# Patient Record
Sex: FEMALE | Race: BLACK OR AFRICAN AMERICAN | NOT HISPANIC OR LATINO | Employment: UNEMPLOYED | URBAN - METROPOLITAN AREA
[De-identification: names, ages, dates, MRNs, and addresses within clinical notes are randomized per-mention and may not be internally consistent; named-entity substitution may affect disease eponyms.]

---

## 2018-04-12 ENCOUNTER — HOSPITAL ENCOUNTER (EMERGENCY)
Facility: OTHER | Age: 63
Discharge: HOME | End: 2018-04-12
Attending: EMERGENCY MEDICINE
Payer: MEDICAID

## 2018-04-12 VITALS
TEMPERATURE: 98 F | HEART RATE: 84 BPM | BODY MASS INDEX: 27.31 KG/M2 | WEIGHT: 160 LBS | DIASTOLIC BLOOD PRESSURE: 87 MMHG | OXYGEN SATURATION: 100 % | SYSTOLIC BLOOD PRESSURE: 121 MMHG | RESPIRATION RATE: 17 BRPM | HEIGHT: 64 IN

## 2018-04-12 DIAGNOSIS — R10.9 ABDOMINAL PAIN: Primary | ICD-10-CM

## 2018-04-12 DIAGNOSIS — K86.1 CHRONIC PANCREATITIS, UNSPECIFIED PANCREATITIS TYPE: ICD-10-CM

## 2018-04-12 LAB
ALBUMIN SERPL BCP-MCNC: 3.3 G/DL
ALP SERPL-CCNC: 129 U/L
ALT SERPL W/O P-5'-P-CCNC: 44 U/L
ANION GAP SERPL CALC-SCNC: 12 MMOL/L
AST SERPL-CCNC: 93 U/L
BACTERIA #/AREA URNS HPF: ABNORMAL /HPF
BASOPHILS # BLD AUTO: 0.01 K/UL
BASOPHILS NFR BLD: 0.1 %
BILIRUB SERPL-MCNC: 1.3 MG/DL
BILIRUB UR QL STRIP: NEGATIVE
BUN SERPL-MCNC: 9 MG/DL
CALCIUM SERPL-MCNC: 9.7 MG/DL
CHLORIDE SERPL-SCNC: 97 MMOL/L
CLARITY UR: CLEAR
CO2 SERPL-SCNC: 23 MMOL/L
COLOR UR: YELLOW
CREAT SERPL-MCNC: 0.8 MG/DL
DIFFERENTIAL METHOD: ABNORMAL
EOSINOPHIL # BLD AUTO: 0.2 K/UL
EOSINOPHIL NFR BLD: 2 %
ERYTHROCYTE [DISTWIDTH] IN BLOOD BY AUTOMATED COUNT: 13.5 %
EST. GFR  (AFRICAN AMERICAN): >60 ML/MIN/1.73 M^2
EST. GFR  (NON AFRICAN AMERICAN): >60 ML/MIN/1.73 M^2
GLUCOSE SERPL-MCNC: 112 MG/DL
GLUCOSE UR QL STRIP: NEGATIVE
HCT VFR BLD AUTO: 40 %
HGB BLD-MCNC: 13.8 G/DL
HGB UR QL STRIP: NEGATIVE
KETONES UR QL STRIP: NEGATIVE
LEUKOCYTE ESTERASE UR QL STRIP: ABNORMAL
LIPASE SERPL-CCNC: 173 U/L
LYMPHOCYTES # BLD AUTO: 3.2 K/UL
LYMPHOCYTES NFR BLD: 38.9 %
MCH RBC QN AUTO: 31.9 PG
MCHC RBC AUTO-ENTMCNC: 34.5 G/DL
MCV RBC AUTO: 92 FL
MICROSCOPIC COMMENT: ABNORMAL
MONOCYTES # BLD AUTO: 0.8 K/UL
MONOCYTES NFR BLD: 9.5 %
NEUTROPHILS # BLD AUTO: 4 K/UL
NEUTROPHILS NFR BLD: 49.3 %
NITRITE UR QL STRIP: NEGATIVE
PH UR STRIP: 7 [PH] (ref 5–8)
PLATELET # BLD AUTO: 197 K/UL
PMV BLD AUTO: 11.9 FL
POTASSIUM SERPL-SCNC: 4.9 MMOL/L
PROT SERPL-MCNC: 9.2 G/DL
PROT UR QL STRIP: NEGATIVE
RBC # BLD AUTO: 4.33 M/UL
RBC #/AREA URNS HPF: 3 /HPF (ref 0–4)
SODIUM SERPL-SCNC: 132 MMOL/L
SP GR UR STRIP: 1.01 (ref 1–1.03)
SQUAMOUS #/AREA URNS HPF: 9 /HPF
URN SPEC COLLECT METH UR: ABNORMAL
UROBILINOGEN UR STRIP-ACNC: ABNORMAL EU/DL
WBC # BLD AUTO: 8.2 K/UL
WBC #/AREA URNS HPF: 23 /HPF (ref 0–5)
WBC CLUMPS URNS QL MICRO: ABNORMAL

## 2018-04-12 PROCEDURE — 25500020 PHARM REV CODE 255: Performed by: EMERGENCY MEDICINE

## 2018-04-12 PROCEDURE — 25000003 PHARM REV CODE 250: Performed by: EMERGENCY MEDICINE

## 2018-04-12 PROCEDURE — 93010 ELECTROCARDIOGRAM REPORT: CPT | Mod: ,,, | Performed by: INTERNAL MEDICINE

## 2018-04-12 PROCEDURE — 96361 HYDRATE IV INFUSION ADD-ON: CPT

## 2018-04-12 PROCEDURE — 80053 COMPREHEN METABOLIC PANEL: CPT

## 2018-04-12 PROCEDURE — 96375 TX/PRO/DX INJ NEW DRUG ADDON: CPT

## 2018-04-12 PROCEDURE — 83690 ASSAY OF LIPASE: CPT

## 2018-04-12 PROCEDURE — 87086 URINE CULTURE/COLONY COUNT: CPT

## 2018-04-12 PROCEDURE — 96374 THER/PROPH/DIAG INJ IV PUSH: CPT

## 2018-04-12 PROCEDURE — 93005 ELECTROCARDIOGRAM TRACING: CPT

## 2018-04-12 PROCEDURE — 63600175 PHARM REV CODE 636 W HCPCS: Performed by: EMERGENCY MEDICINE

## 2018-04-12 PROCEDURE — 85025 COMPLETE CBC W/AUTO DIFF WBC: CPT

## 2018-04-12 PROCEDURE — 81000 URINALYSIS NONAUTO W/SCOPE: CPT

## 2018-04-12 PROCEDURE — 99284 EMERGENCY DEPT VISIT MOD MDM: CPT | Mod: 25

## 2018-04-12 RX ORDER — HYOSCYAMINE SULFATE 0.5 MG/ML
0.5 INJECTION, SOLUTION SUBCUTANEOUS
Status: COMPLETED | OUTPATIENT
Start: 2018-04-12 | End: 2018-04-12

## 2018-04-12 RX ORDER — ONDANSETRON 4 MG/1
4 TABLET, FILM COATED ORAL EVERY 6 HOURS
Qty: 12 TABLET | Refills: 0 | Status: SHIPPED | OUTPATIENT
Start: 2018-04-12

## 2018-04-12 RX ORDER — AMLODIPINE BESYLATE 10 MG/1
10 TABLET ORAL DAILY
COMMUNITY

## 2018-04-12 RX ORDER — HYDROCHLOROTHIAZIDE 25 MG/1
25 TABLET ORAL DAILY
COMMUNITY
End: 2019-08-16

## 2018-04-12 RX ORDER — KETOROLAC TROMETHAMINE 30 MG/ML
15 INJECTION, SOLUTION INTRAMUSCULAR; INTRAVENOUS
Status: COMPLETED | OUTPATIENT
Start: 2018-04-12 | End: 2018-04-12

## 2018-04-12 RX ORDER — LISINOPRIL 10 MG/1
10 TABLET ORAL DAILY
COMMUNITY
End: 2019-07-19

## 2018-04-12 RX ORDER — DICYCLOMINE HYDROCHLORIDE 20 MG/1
20 TABLET ORAL 2 TIMES DAILY
Qty: 20 TABLET | Refills: 0 | Status: SHIPPED | OUTPATIENT
Start: 2018-04-12 | End: 2018-05-12

## 2018-04-12 RX ADMIN — SODIUM CHLORIDE 1000 ML: 0.9 INJECTION, SOLUTION INTRAVENOUS at 06:04

## 2018-04-12 RX ADMIN — KETOROLAC TROMETHAMINE 15 MG: 30 INJECTION, SOLUTION INTRAMUSCULAR at 07:04

## 2018-04-12 RX ADMIN — IOHEXOL 75 ML: 350 INJECTION, SOLUTION INTRAVENOUS at 07:04

## 2018-04-12 RX ADMIN — HYOSCYAMINE SULFATE 0.5 MG: 0.5 INJECTION, SOLUTION SUBCUTANEOUS at 07:04

## 2018-04-12 NOTE — ED TRIAGE NOTES
"Pt c/o epigastric pain x1 week. Pt states " I can't keep anything down. Every time I eat I throw up. " pt denies fever, sob, cp, dysuria. Pt denies taking anything for the pain   "

## 2018-04-12 NOTE — ED PROVIDER NOTES
Encounter Date: 4/12/2018    SCRIBE #1 NOTE: IOmaira, am scribing for, and in the presence of, Dr. Carrington.       History     Chief Complaint   Patient presents with    Abdominal Pain     generalized abdominal pain x1 weak associated with n/v     Time seen by provider: 6:15 AM    This is a 62 y.o. Female, with a history of Hepatitis C and HTN, who presents with complaint of abdominal pain that began one week ago. Patient presents to the ED out of concern she has been unable to eat for several days. She reports diffuse abdominal pain that worsened one day ago. Pain is described as soreness. She reports vomiting after eating that began one week ago. She reports having an appetite, but concerned about eating due to episodes of emesis. She states abdominal pain worsens after eating. She reports concentrated urine since symptom onset. Her last BM was one day ago, but notes it did not feel complete. She reports frequently passing gas near the onset of symptoms, but has not in the last few days. She reports taking OTC medications for gas relief. She denies fever, chills, diarrhea, constipation, blood in stool, difficulty urinating, dysuria, urinary urgency, or urinary frequency. Patient states her  recently passed away from colon cancer which has made her more concerned about her current symptoms. She denies any previous abdominal surgeries. She denies any previous colonoscopy. She denies any recent alcohol, and noting last use was about 7 months ago.       The history is provided by the patient.     Review of patient's allergies indicates:   Allergen Reactions    Codeine      Past Medical History:   Diagnosis Date    Hepatitis C     Hypertension     Neuropathy      History reviewed. No pertinent surgical history.  History reviewed. No pertinent family history.  Social History   Substance Use Topics    Smoking status: Current Every Day Smoker    Smokeless tobacco: Not on file    Alcohol use Yes      Review of Systems   Constitutional: Negative for chills and fever.   HENT: Negative for sore throat.    Respiratory: Negative for shortness of breath.    Cardiovascular: Negative for chest pain.   Gastrointestinal: Positive for abdominal pain, nausea and vomiting. Negative for blood in stool, constipation and diarrhea.   Genitourinary: Negative for difficulty urinating, dysuria, frequency and urgency.        Positive for concentrated urine.    Musculoskeletal: Negative for back pain.   Skin: Negative for rash.   Neurological: Negative for weakness.   Hematological: Does not bruise/bleed easily.       Physical Exam     Initial Vitals [04/12/18 0526]   BP Pulse Resp Temp SpO2   119/67 76 16 97.7 °F (36.5 °C) 96 %      MAP       84.33         Physical Exam    Nursing note and vitals reviewed.  Constitutional: She appears well-developed and well-nourished. She is cooperative.  Non-toxic appearance. No distress.   HENT:   Head: Normocephalic and atraumatic.   Mouth/Throat: Oropharynx is clear and moist.   Eyes: Conjunctivae and EOM are normal. Pupils are equal, round, and reactive to light.   Neck: Normal range of motion and full passive range of motion without pain. Neck supple. No thyromegaly present. No JVD present.   Cardiovascular: Normal rate, regular rhythm, normal heart sounds and normal pulses.   Pulmonary/Chest: Effort normal and breath sounds normal. No respiratory distress.   Abdominal: Normal appearance. She exhibits distension (Mild). She exhibits no mass. There is hepatomegaly. There is tenderness (Vague).   Diminished bowel sounds.    Musculoskeletal: Normal range of motion.   Neurological: She is alert and oriented to person, place, and time. She has normal strength. No cranial nerve deficit or sensory deficit.   Skin: Skin is warm, dry and intact. No rash noted.   No juandice.    Psychiatric: She has a normal mood and affect. Her speech is normal and behavior is normal. Judgment and thought content  normal.         ED Course   Procedures  Labs Reviewed   URINALYSIS - Abnormal; Notable for the following:        Result Value    Urobilinogen, UA 4.0-6.0 (*)     Leukocytes, UA 2+ (*)     All other components within normal limits   CBC W/ AUTO DIFFERENTIAL - Abnormal; Notable for the following:     MCH 31.9 (*)     All other components within normal limits   COMPREHENSIVE METABOLIC PANEL - Abnormal; Notable for the following:     Sodium 132 (*)     Glucose 112 (*)     Total Protein 9.2 (*)     Albumin 3.3 (*)     Total Bilirubin 1.3 (*)     AST 93 (*)     All other components within normal limits   LIPASE - Abnormal; Notable for the following:     Lipase 173 (*)     All other components within normal limits   URINALYSIS MICROSCOPIC - Abnormal; Notable for the following:     WBC, UA 23 (*)     WBC Clumps, UA Occasional (*)     Bacteria, UA Few (*)     All other components within normal limits   CULTURE, URINE     Imaging Results          CT Abdomen Pelvis With Contrast (Final result)  Result time 04/12/18 07:52:42    Final result by Rajinder Lopez MD (04/12/18 07:52:42)                 Impression:      1.  Mild peripancreatic inflammatory change with slight prominence of the pancreatic head and uncinate suspicious for mild acute pancreatitis.  No peripancreatic fluid collections identified.    2.  Mildly enlarged mary kay hepatis and peripancreatic lymph nodes as discussed above.  These are nonspecific and could be reactive in etiology.  Continued follow-up is advised.    3.  Uterine fibroids.    Four.  Aortic atherosclerosis.      Electronically signed by: Rajinder Lopez MD  Date:    04/12/2018  Time:    07:52             Narrative:    EXAMINATION:  CT ABDOMEN PELVIS WITH CONTRAST    CLINICAL HISTORY:  Weight loss, unintended, non-localized abd pain;    TECHNIQUE:  Low dose axial images, sagittal and coronal reformations were obtained from the lung bases to the pubic symphysis following the IV administration of 75  mL of Omnipaque 350 .  Oral contrast was not given.    COMPARISON:  None.    FINDINGS:  There is mild dependent atelectasis at the lung bases.  There is no significant pleural effusion.  The visualized portions of the heart appear normal.    The liver is normal in size and attenuation with no focal hepatic abnormality.  The gallbladder shows no evidence of stones or pericholecystic fluid.  There is no intra-or extrahepatic biliary ductal dilatation.    There is mild peripancreatic inflammatory change with slight prominence of the pancreatic head and uncinate suspicious for mild acute pancreatitis.  No discrete focal fluid collection is identified.  There are multiple mildly enlarged mary kay hepatis and peripancreatic lymph nodes.  These measure up to 0.9 cm in short axis (for example axial series 1, image 25).  The main pancreatic duct appears within normal limits.    The stomach, spleen, and adrenal glands are unremarkable.  The portal vein and SMV are patent.    The kidneys are normal in size and location and concentrate and excrete contrast properly on delayed imaging.  There is no evidence of hydronephrosis. The urinary bladder is unremarkable. The uterus demonstrates a lobular contour with multiple dystrophic calcifications suggesting uterine fibroids.  There is no significant free fluid within the pelvis.    The abdominal aorta is normal in course and caliber with moderate atherosclerotic change along its course.    The visualized loops of small and large bowel show no evidence of obstruction or inflammation.  The appendix appears within normal limits.  There is no free intraperitoneal air or ascites.    There are degenerative changes of the lumbar spine, most notably at L4-L5.  There is degenerative change of the bilateral hips, left more so than right.  The extraperitoneal soft tissues are unremarkable.                                EKG Readings: (Independently Interpreted)   Normal sinus rhythm at a rate of  62 bpm. Normal axis. Prolonged Qtc at 549. No STEMI.           Medical Decision Making:   Initial Assessment:   Urgent evaluation of 62-year-old female with hypertension and chronic hepatitis C presenting with several weeks of abdominal discomfort, diminished food intake, nausea, and weight loss.  Patient denies alcohol intake, no history of abdominal surgeries.  Patient denies history of colonoscopy, no family history of colon ca.  On exam patient has mild abdominal distention, with hepatomegaly, no focal tenderness, diminished bowel sounds.  Differential includes malignancy, pancreatitis, ascites, SBO, constipation. Will admin antiemetics, ivf, analgesics and reassess.   Independently Interpreted Test(s):   I have ordered and independently interpreted EKG Reading(s) - see prior notes  Clinical Tests:   Lab Tests: Ordered and Reviewed  Radiological Study: Ordered and Reviewed  Medical Tests: Ordered and Reviewed  ED Management:  Pt feeling improved after medications, with return in appetite  Labs notable for nml cbc, t bili 1.3 ast 93/alt 44, mild elevated lipase 173 (improved from prior) in setting of known hep C  ua w wbc 23 w 9 sq epi and without dysuria, will hold off abx at this time, Ucx ordered.   Imaging with mild peripancreatic inflam without fluid collections, enlarged hepatic lymph nodes  Discussed these findings with the patient and suspect symptoms related to underlying hepatitis, and now with acute mild pancreatitis. Pt agreeable to discharge home with liquid diet and follow up scheduled with her hepatologist next week.               Attending Attestation:           Physician Attestation for Scribe:  Physician Attestation Statement for Scribe #1: I, Dr. Carrington, reviewed documentation, as scribed by Omaira Watkins in my presence, and it is both accurate and complete.                    Clinical Impression:     1. Abdominal pain    2. Chronic pancreatitis, unspecified pancreatitis type         Disposition:   Disposition: Discharged  Condition: Man Carrington MD  04/12/18 0836

## 2018-04-12 NOTE — ED NOTES
Rounding on pt completed. Pt lying stretcher. Pt still with abdominal pain. MD notified. Instructed pt about the need for urine sample. Pt unable to provide sample. Pt AAOx4 and appropriate at this time. Respirations even and unlabored. No acute distress noted. Pt updated on POC. Bed is locked and in lowest position with side rails up x2. Call bell within reach and pt oriented to use of call bell. Pt on continuous cardiac monitoring, continuous pulse ox, and continuous BP cuff. Will continue to monitor.

## 2018-04-13 LAB — BACTERIA UR CULT: NORMAL

## 2018-10-11 ENCOUNTER — HOSPITAL ENCOUNTER (EMERGENCY)
Facility: OTHER | Age: 63
Discharge: HOME | End: 2018-10-11
Attending: EMERGENCY MEDICINE
Payer: MEDICAID

## 2018-10-11 VITALS
WEIGHT: 156 LBS | HEIGHT: 64 IN | RESPIRATION RATE: 17 BRPM | SYSTOLIC BLOOD PRESSURE: 148 MMHG | OXYGEN SATURATION: 96 % | DIASTOLIC BLOOD PRESSURE: 96 MMHG | HEART RATE: 83 BPM | TEMPERATURE: 98 F | BODY MASS INDEX: 26.63 KG/M2

## 2018-10-11 DIAGNOSIS — H49.22 SIXTH NERVE PALSY OF LEFT EYE: Primary | ICD-10-CM

## 2018-10-11 DIAGNOSIS — H53.2 DIPLOPIA: ICD-10-CM

## 2018-10-11 LAB
ANION GAP SERPL CALC-SCNC: 11 MMOL/L
BASOPHILS # BLD AUTO: 0.02 K/UL
BASOPHILS NFR BLD: 0.4 %
BUN SERPL-MCNC: 6 MG/DL
CALCIUM SERPL-MCNC: 9.1 MG/DL
CHLORIDE SERPL-SCNC: 102 MMOL/L
CO2 SERPL-SCNC: 24 MMOL/L
CREAT SERPL-MCNC: 0.7 MG/DL
DIFFERENTIAL METHOD: ABNORMAL
EOSINOPHIL # BLD AUTO: 0.1 K/UL
EOSINOPHIL NFR BLD: 1.9 %
ERYTHROCYTE [DISTWIDTH] IN BLOOD BY AUTOMATED COUNT: 14.8 %
EST. GFR  (AFRICAN AMERICAN): >60 ML/MIN/1.73 M^2
EST. GFR  (NON AFRICAN AMERICAN): >60 ML/MIN/1.73 M^2
GLUCOSE SERPL-MCNC: 131 MG/DL
HCT VFR BLD AUTO: 38 %
HGB BLD-MCNC: 13 G/DL
LYMPHOCYTES # BLD AUTO: 2.6 K/UL
LYMPHOCYTES NFR BLD: 50.3 %
MCH RBC QN AUTO: 31 PG
MCHC RBC AUTO-ENTMCNC: 34.2 G/DL
MCV RBC AUTO: 91 FL
MONOCYTES # BLD AUTO: 0.5 K/UL
MONOCYTES NFR BLD: 9.7 %
NEUTROPHILS # BLD AUTO: 1.9 K/UL
NEUTROPHILS NFR BLD: 37.5 %
PLATELET # BLD AUTO: 124 K/UL
PMV BLD AUTO: 11.2 FL
POTASSIUM SERPL-SCNC: 3.3 MMOL/L
RBC # BLD AUTO: 4.2 M/UL
SODIUM SERPL-SCNC: 137 MMOL/L
WBC # BLD AUTO: 5.13 K/UL

## 2018-10-11 PROCEDURE — 85025 COMPLETE CBC W/AUTO DIFF WBC: CPT

## 2018-10-11 PROCEDURE — 80048 BASIC METABOLIC PNL TOTAL CA: CPT

## 2018-10-11 PROCEDURE — 99284 EMERGENCY DEPT VISIT MOD MDM: CPT

## 2018-10-11 RX ORDER — GABAPENTIN 300 MG/1
300 CAPSULE ORAL 3 TIMES DAILY
COMMUNITY
End: 2019-07-19 | Stop reason: ALTCHOICE

## 2018-10-11 NOTE — ED PROVIDER NOTES
Encounter Date: 10/11/2018    SCRIBE #1 NOTE: I, Sid Silva, am scribing for, and in the presence of, Dr. Leigh.       History     Chief Complaint   Patient presents with    Diplopia     pt with double  vision since monday.     Time seen by provider: 8:02 AM    This is a 62 y.o. female with a history of HTN who presents with complaint of diplopia that began approximately three days ago. The vision is described as seeing double up and down. She reports that she has been having to cover one eye to help focus. The patient reports that her family has noticed that her eyes haven't been moving straight. She denies fever, chest pain, shortness of breath, nausea, headaches, and weakness.      The history is provided by the patient.     Review of patient's allergies indicates:   Allergen Reactions    Codeine      Past Medical History:   Diagnosis Date    Hepatitis C     Hypertension     Neuropathy      History reviewed. No pertinent surgical history.  No family history on file.  Social History     Tobacco Use    Smoking status: Current Every Day Smoker   Substance Use Topics    Alcohol use: Yes    Drug use: Not on file     Review of Systems   Constitutional: Negative for fever.   HENT: Negative for sore throat.    Eyes: Positive for visual disturbance.   Respiratory: Negative for shortness of breath.    Cardiovascular: Negative for chest pain.   Gastrointestinal: Negative for nausea.   Genitourinary: Negative for dysuria.   Musculoskeletal: Negative for back pain.   Skin: Negative for rash.   Neurological: Negative for weakness and headaches.   Hematological: Does not bruise/bleed easily.       Physical Exam     Initial Vitals [10/11/18 0754]   BP Pulse Resp Temp SpO2   (!) 139/93 94 18 98.1 °F (36.7 °C) 97 %      MAP       --         Physical Exam    Constitutional: She appears well-developed and well-nourished. She is not diaphoretic. No distress.   HENT:   Head: Normocephalic and atraumatic.   Eyes:    Double vision when looking to the left. Inability of left eye to look laterally past sixty degrees.   Cardiovascular: Normal rate, regular rhythm and normal heart sounds. Exam reveals no gallop and no friction rub.    No murmur heard.  Pulmonary/Chest: Breath sounds normal. No respiratory distress. She has no wheezes. She has no rhonchi. She has no rales.   Musculoskeletal: Normal range of motion. She exhibits no edema or tenderness.   Neurological: She is alert and oriented to person, place, and time. She has normal strength. She displays normal reflexes. No sensory deficit. GCS score is 15. GCS eye subscore is 4. GCS verbal subscore is 5. GCS motor subscore is 6.   Otherwise cranial nerves are intact. No cerebral sign's. No Romberg's sign.   Skin: Skin is warm and dry. No rash and no abscess noted. No erythema. No pallor.   Psychiatric: She has a normal mood and affect. Her behavior is normal. Judgment and thought content normal.         ED Course   Procedures  Labs Reviewed   CBC W/ AUTO DIFFERENTIAL - Abnormal; Notable for the following components:       Result Value    RDW 14.8 (*)     Platelets 124 (*)     Gran% 37.5 (*)     Lymph% 50.3 (*)     All other components within normal limits    Narrative:     Recoll. [id number] by [facility] at 10/11/2018 09:25, reason: clotted and   hemolyzed;notified marquise wolf rn er @09   BASIC METABOLIC PANEL - Abnormal; Notable for the following components:    Potassium 3.3 (*)     Glucose 131 (*)     BUN, Bld 6 (*)     All other components within normal limits    Narrative:     Recoll. [id number] by [facility] at 10/11/2018 09:25, reason: clotted and   hemolyzed;notified marquise wolf rn er @0953          Imaging Results          CT Head Without Contrast (Final result)  Result time 10/11/18 08:31:54    Final result by Gerardo Camargo DO (10/11/18 08:31:54)                 Impression:      No acute intracranial findings.    Age-appropriate cerebral volume loss with moderate patchy  decreased attenuation supratentorial white matter while nonspecific suggestive for chronic ischemic change.    No evidence for acute intracranial hemorrhage.  Clinical correlation and further evaluation as warranted.      Electronically signed by: Gerardo Camargo DO  Date:    10/11/2018  Time:    08:31             Narrative:    EXAMINATION:  CT HEAD WITHOUT CONTRAST    CLINICAL HISTORY:  diplopia;  Diplopia    TECHNIQUE:  Multiple sequential 5 mm axial images of the head without contrast.  Coronal and sagittal reformatted imaging from the axial acquisition.    COMPARISON:  None    FINDINGS:  There is mild age-appropriate generalized cerebral volume loss.  Compensatory enlargement of the ventricle sulci and cisterns without hydrocephalus.  There is no midline shift or mass effect.  There is moderate ill-defined decreased attenuation in the supratentorial white matter while nonspecific suggestive for chronic ischemic change.  There is no evidence for acute intracranial hemorrhage or sulcal effacement.  There is no midline shift or mass effect.  Visualized paranasal sinuses and mastoid air cells are clear..                                 Medical Decision Making:   Clinical Tests:   Radiological Study: Ordered and Reviewed  ED Management:  A 62-year-old female presents with 3 days of diplopia.  Based on my physical exam appears to be consistent with a lateral rectus palsy, cranial nerve 6.  CT scan shows no acute abnormality.  Will get some basic blood work and speak with neural Ophthalmology.    10:00 a.m. blood work reviewed showing no acute abnormalities.  Awaiting to hear back from neuro ophthalmology.  I have consulted Dr. De Simms.     I spoke with Dr Simms.  He recommended following up as an outpatient as well as MRI as an outpatient.  Updated the patient answered all questions.    Patient discharged home in stable condition. Diagnosis and treatment plan explained to patient. I have answered all questions  and the patient is satisfied with the plan of care. The patient demonstrates understanding of the care plan. This is the extent to the patients complaints today here in the emergency department.            Scribe Attestation:   Scribe #1: I performed the above scribed service and the documentation accurately describes the services I performed. I attest to the accuracy of the note.    Attending Attestation:           Physician Attestation for Scribe:  Physician Attestation Statement for Scribe #1: I, Dr. Leigh, reviewed documentation, as scribed by Sid Silva in my presence, and it is both accurate and complete.                    Clinical Impression:     1. Sixth nerve palsy of left eye    2. Diplopia                                   Tod Leigh, DO  10/11/18 0329

## 2018-10-17 ENCOUNTER — OFFICE VISIT (OUTPATIENT)
Dept: OPHTHALMOLOGY | Facility: CLINIC | Age: 63
End: 2018-10-17
Payer: MEDICAID

## 2018-10-17 DIAGNOSIS — H35.033 HYPERTENSIVE RETINOPATHY OF BOTH EYES, GRADE 1: ICD-10-CM

## 2018-10-17 DIAGNOSIS — H49.22 SIXTH CRANIAL NERVE PALSY, LEFT: ICD-10-CM

## 2018-10-17 DIAGNOSIS — G52.9 CRANIAL NERVE PALSY: ICD-10-CM

## 2018-10-17 PROCEDURE — 99999 PR PBB SHADOW E&M-EST. PATIENT-LVL III: CPT | Mod: PBBFAC,,, | Performed by: OPHTHALMOLOGY

## 2018-10-17 PROCEDURE — 99213 OFFICE O/P EST LOW 20 MIN: CPT | Mod: PBBFAC | Performed by: OPHTHALMOLOGY

## 2018-10-17 PROCEDURE — 92004 COMPRE OPH EXAM NEW PT 1/>: CPT | Mod: S$PBB,,, | Performed by: OPHTHALMOLOGY

## 2018-10-17 RX ORDER — VENLAFAXINE HYDROCHLORIDE 37.5 MG/1
CAPSULE, EXTENDED RELEASE ORAL
COMMUNITY
Start: 2018-07-20

## 2018-10-17 NOTE — PROGRESS NOTES
HPI     Follow-up      Additional comments: Sixth nerve palsy of left eye               Comments     Patient here for ED follow up Sixth nerve palsy OS.  Pt states OS vision brighter and blurry. Pt covering the OS to help her   move around.  Vision seem to be Side by side and top and bottom.  5 on pain scale- over the OS.    I have personally interviewed the patient, reviewed the history and   examined the patient and agree with the technician's exam.          Last edited by De Simms MD on 10/17/2018 10:53 AM. (History)            Assessment /Plan     For exam results, see Encounter Report.    Sixth cranial nerve palsy, left  -     MRI Brain W WO Contrast; Future; Expected date: 10/17/2018    Hypertensive retinopathy of both eyes, grade 1  -     MRI Brain W WO Contrast; Future; Expected date: 10/17/2018    Cranial nerve palsy  -     MRI Brain W WO Contrast; Future; Expected date: 10/17/2018      Continue to occlude left eye as desired to relieve double vision.  MRI as scheduled.  Return as needed.

## 2018-10-17 NOTE — PATIENT INSTRUCTIONS
Continue to occlude left eye as desired to relieve double vision.  MRI as scheduled.  Return as needed.

## 2018-10-22 ENCOUNTER — HOSPITAL ENCOUNTER (OUTPATIENT)
Dept: RADIOLOGY | Facility: HOSPITAL | Age: 63
Discharge: HOME | End: 2018-10-22
Attending: OPHTHALMOLOGY
Payer: MEDICAID

## 2018-10-22 DIAGNOSIS — H35.033 HYPERTENSIVE RETINOPATHY OF BOTH EYES, GRADE 1: ICD-10-CM

## 2018-10-22 DIAGNOSIS — H49.22 SIXTH CRANIAL NERVE PALSY, LEFT: ICD-10-CM

## 2018-10-22 DIAGNOSIS — G52.9 CRANIAL NERVE PALSY: ICD-10-CM

## 2018-10-22 PROCEDURE — A9585 GADOBUTROL INJECTION: HCPCS | Performed by: OPHTHALMOLOGY

## 2018-10-22 PROCEDURE — 70553 MRI BRAIN STEM W/O & W/DYE: CPT | Mod: TC

## 2018-10-22 PROCEDURE — 25500020 PHARM REV CODE 255: Performed by: OPHTHALMOLOGY

## 2018-10-22 PROCEDURE — 70553 MRI BRAIN STEM W/O & W/DYE: CPT | Mod: 26,,, | Performed by: RADIOLOGY

## 2018-10-22 RX ORDER — GADOBUTROL 604.72 MG/ML
7 INJECTION INTRAVENOUS
Status: COMPLETED | OUTPATIENT
Start: 2018-10-22 | End: 2018-10-22

## 2018-10-22 RX ADMIN — GADOBUTROL 7 ML: 604.72 INJECTION INTRAVENOUS at 06:10

## 2018-10-23 ENCOUNTER — TELEPHONE (OUTPATIENT)
Dept: OPHTHALMOLOGY | Facility: CLINIC | Age: 63
End: 2018-10-23

## 2018-10-23 DIAGNOSIS — H49.22 SIXTH CRANIAL NERVE PALSY, LEFT: Primary | ICD-10-CM

## 2018-10-23 NOTE — TELEPHONE ENCOUNTER
The MRI demonstrated no lesions affecting the sixth cranial nerve. The process must be microvascular. I notified Ms. Jin and she indicated understanding. She will return to me in two months if her diplopia persists.

## 2019-07-12 ENCOUNTER — DOCUMENTATION ONLY (OUTPATIENT)
Dept: TRANSPLANT | Facility: CLINIC | Age: 64
End: 2019-07-12

## 2019-07-12 ENCOUNTER — TELEPHONE (OUTPATIENT)
Dept: TRANSPLANT | Facility: CLINIC | Age: 64
End: 2019-07-12

## 2019-07-12 NOTE — TELEPHONE ENCOUNTER
Returned call. Referral is not urgent. Pt with HCV, liver enzymes mildly elevated.  AFP 54.5. Only ultrasound done.

## 2019-07-12 NOTE — TELEPHONE ENCOUNTER
----- Message from Joseph Lorenzo sent at 7/12/2019  8:21 AM CDT -----  Contact: Israel Xiao ([facility]): [phone]  Needs Advice    Reason for call: Israel would like to know if the records for the pt was received         Communication Preference: Israel Xiao ([facility]): [phone]

## 2019-07-12 NOTE — NURSING
Pt records reviewed.  Pt will be referred to Hepatology due to HCV cirrhosis with abnormal ultrasound with liver mass, no additional imaging done. Ul[facility]ound requested from [facility]. Will need to be pulled by film room. MELD  9  Initial referral received  from Arianne Tate  Referral letter sent to provider and patient.  Pt schedule 7/19 at 3 pm. Pt accepted appt.

## 2019-07-19 ENCOUNTER — INITIAL CONSULT (OUTPATIENT)
Dept: TRANSPLANT | Facility: CLINIC | Age: 64
End: 2019-07-19
Payer: MEDICAID

## 2019-07-19 ENCOUNTER — TELEPHONE (OUTPATIENT)
Dept: HEPATOLOGY | Facility: CLINIC | Age: 64
End: 2019-07-19

## 2019-07-19 VITALS
HEIGHT: 64 IN | HEART RATE: 87 BPM | SYSTOLIC BLOOD PRESSURE: 133 MMHG | WEIGHT: 151.44 LBS | TEMPERATURE: 98 F | BODY MASS INDEX: 25.85 KG/M2 | RESPIRATION RATE: 18 BRPM | DIASTOLIC BLOOD PRESSURE: 88 MMHG | OXYGEN SATURATION: 100 %

## 2019-07-19 DIAGNOSIS — E55.9 VITAMIN D DEFICIENCY: ICD-10-CM

## 2019-07-19 DIAGNOSIS — E78.5 HYPERLIPIDEMIA, UNSPECIFIED HYPERLIPIDEMIA TYPE: ICD-10-CM

## 2019-07-19 DIAGNOSIS — L30.9 ECZEMA, UNSPECIFIED TYPE: ICD-10-CM

## 2019-07-19 DIAGNOSIS — G62.9 POLYNEUROPATHY: ICD-10-CM

## 2019-07-19 DIAGNOSIS — B19.20 HEPATITIS C VIRUS INFECTION WITHOUT HEPATIC COMA, UNSPECIFIED CHRONICITY: ICD-10-CM

## 2019-07-19 DIAGNOSIS — I10 HYPERTENSION, UNSPECIFIED TYPE: ICD-10-CM

## 2019-07-19 DIAGNOSIS — R18.8 OTHER ASCITES: ICD-10-CM

## 2019-07-19 DIAGNOSIS — Z87.19 HISTORY OF ACUTE PANCREATITIS: ICD-10-CM

## 2019-07-19 DIAGNOSIS — L29.9 PRURITUS: Primary | ICD-10-CM

## 2019-07-19 DIAGNOSIS — K74.60 HEPATIC CIRRHOSIS, UNSPECIFIED HEPATIC CIRRHOSIS TYPE, UNSPECIFIED WHETHER ASCITES PRESENT: ICD-10-CM

## 2019-07-19 DIAGNOSIS — R77.2 HIGH ALPHA FETOPROTEIN (AFP) TUMOR MARKER: ICD-10-CM

## 2019-07-19 DIAGNOSIS — I70.0 AORTIC ATHEROSCLEROSIS: ICD-10-CM

## 2019-07-19 PROCEDURE — 99215 OFFICE O/P EST HI 40 MIN: CPT | Mod: PBBFAC,PN | Performed by: INTERNAL MEDICINE

## 2019-07-19 PROCEDURE — 99205 PR OFFICE/OUTPT VISIT, NEW, LEVL V, 60-74 MIN: ICD-10-PCS | Mod: S$PBB,,, | Performed by: INTERNAL MEDICINE

## 2019-07-19 PROCEDURE — 99999 PR PBB SHADOW E&M-EST. PATIENT-LVL V: ICD-10-PCS | Mod: PBBFAC,,, | Performed by: INTERNAL MEDICINE

## 2019-07-19 PROCEDURE — 99205 OFFICE O/P NEW HI 60 MIN: CPT | Mod: S$PBB,,, | Performed by: INTERNAL MEDICINE

## 2019-07-19 PROCEDURE — 99999 PR PBB SHADOW E&M-EST. PATIENT-LVL V: CPT | Mod: PBBFAC,,, | Performed by: INTERNAL MEDICINE

## 2019-07-19 RX ORDER — SPIRONOLACTONE 50 MG/1
100 TABLET, FILM COATED ORAL DAILY
Qty: 60 TABLET | Refills: 11 | Status: SHIPPED | OUTPATIENT
Start: 2019-07-19 | End: 2019-08-16

## 2019-07-19 RX ORDER — FUROSEMIDE 40 MG/1
40 TABLET ORAL 2 TIMES DAILY
Qty: 60 TABLET | Refills: 11 | Status: SHIPPED | OUTPATIENT
Start: 2019-07-19 | End: 2019-08-16

## 2019-07-19 NOTE — PATIENT INSTRUCTIONS
1.blood tests  2. Ct chest/abdomen/pelvis  3. tx eval  4. Quit smoking  5. Neurologist  6. Egd/colonoscopy  7, paracentesis and diuretics  8. Start lasix and spironolactone after paracentesis  9. Stop lisinopril  Return 4 weeks

## 2019-07-19 NOTE — TELEPHONE ENCOUNTER
----- Message from Yesenia Fraire MA sent at 7/19/2019 12:14 PM CDT -----  Contact: pt      ----- Message -----  From: Nora Zuleta  Sent: 7/19/2019  10:19 AM  To: [facility] Staff    Needs Advice    Reason for call: Patient states she would like to speak with someone concerning todays appointment          Communication Preference:  [phone]    Additional Information: n/a

## 2019-07-21 PROBLEM — Z87.19 HISTORY OF ACUTE PANCREATITIS: Status: ACTIVE | Noted: 2019-07-21

## 2019-07-21 PROBLEM — I70.0 AORTIC ATHEROSCLEROSIS: Status: ACTIVE | Noted: 2019-07-21

## 2019-07-22 ENCOUNTER — TELEPHONE (OUTPATIENT)
Dept: TRANSPLANT | Facility: CLINIC | Age: 64
End: 2019-07-22

## 2019-07-22 ENCOUNTER — TELEPHONE (OUTPATIENT)
Dept: HEPATOLOGY | Facility: CLINIC | Age: 64
End: 2019-07-22

## 2019-07-22 NOTE — TELEPHONE ENCOUNTER
----- Message from Abril Porter MD sent at 7/19/2019  4:03 PM CDT -----  tx eval for HCC; needs urgent ct next week and paracentesis on same day

## 2019-07-22 NOTE — TELEPHONE ENCOUNTER
Referral received from Dr Porter  Initial  referral from  Arianne Burgess   Patient with decompensated cirrhosis, a liver mass suspicious for HCC and chronic Hepatitis C. MELD 9  ICD-10:  CM: K74.60  Referred for liver transplant for EVALUATION.    Referral completed and forwarded to [facility].          Insurance: Epic

## 2019-07-25 ENCOUNTER — TELEPHONE (OUTPATIENT)
Dept: HEPATOLOGY | Facility: CLINIC | Age: 64
End: 2019-07-25

## 2019-07-25 NOTE — TELEPHONE ENCOUNTER
Called Rosa   Several times at [phone]. No answered.    ----- Message from Vera Sears sent at 7/25/2019 12:05 PM CDT -----  Contact: Rosa w/[facility] [phone]  Calling to get clarification on an order for pt    Pt scheduled for Monday    Pls contact Rosa

## 2019-07-29 ENCOUNTER — TELEPHONE (OUTPATIENT)
Dept: HEPATOLOGY | Facility: CLINIC | Age: 64
End: 2019-07-29

## 2019-07-29 ENCOUNTER — HOSPITAL ENCOUNTER (OUTPATIENT)
Dept: RADIOLOGY | Facility: OTHER | Age: 64
Discharge: HOME | End: 2019-07-29
Attending: INTERNAL MEDICINE
Payer: MEDICAID

## 2019-07-29 DIAGNOSIS — B19.20 HEPATITIS C VIRUS INFECTION WITHOUT HEPATIC COMA, UNSPECIFIED CHRONICITY: ICD-10-CM

## 2019-07-29 PROBLEM — C22.0 HEPATOMA: Status: ACTIVE | Noted: 2019-07-29

## 2019-07-29 PROCEDURE — 71260 CT THORAX DX C+: CPT | Mod: 26,TXP,, | Performed by: RADIOLOGY

## 2019-07-29 PROCEDURE — 74177 CT ABD & PELVIS W/CONTRAST: CPT | Mod: 26,TXP,, | Performed by: RADIOLOGY

## 2019-07-29 PROCEDURE — 74177 CT CHEST ABDOMEN PELVIS WITH CONTRAST (XPD): ICD-10-PCS | Mod: 26,TXP,, | Performed by: RADIOLOGY

## 2019-07-29 PROCEDURE — 74177 CT ABD & PELVIS W/CONTRAST: CPT | Mod: TC,TXP

## 2019-07-29 PROCEDURE — 71260 CT CHEST ABDOMEN PELVIS WITH CONTRAST (XPD): ICD-10-PCS | Mod: 26,TXP,, | Performed by: RADIOLOGY

## 2019-07-29 PROCEDURE — 25500020 PHARM REV CODE 255: Mod: TXP | Performed by: INTERNAL MEDICINE

## 2019-07-29 RX ADMIN — IOHEXOL 75 ML: 350 INJECTION, SOLUTION INTRAVENOUS at 12:07

## 2019-07-29 NOTE — TELEPHONE ENCOUNTER
Patient: Mar Jin       MRN: [id number]      : 1955     Age: 63 y.o.  [address] [facility] [address]    Provider: Hepatologist - [place]    Urgency of review: urgent    Patient Transplant Status: In Evaluation    Reason for presentation: Initial staging for transplant    Clinical Summary: Mar Jin is a 63 y.o. female with a PMHx htn, vit D deficiency, eczematoid dermatitishyperlipidemia and leg weakness diagnosed as polyneuropathy, who presents for evaluation of HCV and a liver mass     --Dx with HCV Aug 2017; G1A; VL 2,760,000 IU/mL 19; no NS5A resisitance  -HAV IgG+; HBsAg-, HBcAb+, HBsAb+  --HCV risk factors: remote IVDA and tattoos, babyboomer  --APRI 1.404; fibrosure F4; fibroscan kPa 66.9 (F4)-c/w with diagnosis of cirrhosis     Labs 6/10/19: ALT 46, AST 82, Tbil 0.6, ALKP 173. plts 146, Tbil 0.6, Na 138, creat 0.6, INR 1.2     CT scan 18- no cirrhosis noted  US at [facility] 19: cirrhosis, ascites, 4.1 cm lesion in the right lobe of the liver  AFP 54.5     --ascites began 2 weeks ago  --no issues with cognition  --no history of EV bleeding     --no significant alcohol  --neuropathy- no response to gabapentin; rides a motorized vehicle x 1 year- has to walk bending over   --lost 10 lbs     -- passed away; no children but has support- family lives in same apt complex    Imaging to be reviewed: CT scan 19    HCC Treatment History: n/a    ABO:     Platelets:   Lab Results   Component Value Date/Time     (L) 2019 09:45 AM     Creatinine:   Lab Results   Component Value Date/Time    CREATININE 1.1 2019 09:45 AM     Bilirubin:   Lab Results   Component Value Date/Time    BILITOT 1.0 2019 09:45 AM     AFP Last 3 each if available: No results found for: AFP, EXTAFP    MELD: MELD-Na score: 8 at 2019  9:45 AM  MELD score: 8 at 2019  9:45 AM  Calculated from:  Serum Creatinine: 1.1 mg/dL at 2019  9:45 AM  Serum Sodium: 133 mmol/L at  7/29/2019  9:45 AM  Total Bilirubin: 1.0 mg/dL at 7/29/2019  9:45 AM  INR(ratio): 1.1 at 7/29/2019  9:45 AM  Age: 63 years    Plan:     Follow-up Provider:

## 2019-07-30 ENCOUNTER — CONFERENCE (OUTPATIENT)
Dept: TRANSPLANT | Facility: CLINIC | Age: 64
End: 2019-07-30

## 2019-07-31 NOTE — TELEPHONE ENCOUNTER
Patient: Mar Jin       MRN: [id number]      : 1955     Age: 63 y.o.  [address] [facility] [address]    Provider: Hepatologist - [place]    Urgency of review: urgent    Patient Transplant Status: In Evaluation    Reason for presentation: Initial staging for transplant    Clinical Summary: Mar Jin is a 63 y.o. female with a PMHx htn, vit D deficiency, eczematoid dermatitishyperlipidemia and leg weakness diagnosed as polyneuropathy, who presents for evaluation of HCV and a liver mass     --Dx with HCV Aug 2017; G1A; VL 2,760,000 IU/mL 19; no NS5A resisitance  -HAV IgG+; HBsAg-, HBcAb+, HBsAb+  --HCV risk factors: remote IVDA and tattoos, babyboomer  --APRI 1.404; fibrosure F4; fibroscan kPa 66.9 (F4)-c/w with diagnosis of cirrhosis     Labs 6/10/19: ALT 46, AST 82, Tbil 0.6, ALKP 173. plts 146, Tbil 0.6, Na 138, creat 0.6, INR 1.2     CT scan 18- no cirrhosis noted  US at [facility] 19: cirrhosis, ascites, 4.1 cm lesion in the right lobe of the liver  AFP 54.5     --ascites began 2 weeks ago  --no issues with cognition  --no history of EV bleeding     --no significant alcohol  --neuropathy- no response to gabapentin; rides a motorized vehicle x 1 year- has to walk bending over   --lost 10 lbs     -- passed away; no children but has support- family lives in same apt complex    Imaging to be reviewed: CT scan 19    HCC Treatment History: n/a    ABO:     Platelets:   Lab Results   Component Value Date/Time     (L) 2019 09:45 AM     Creatinine:   Lab Results   Component Value Date/Time    CREATININE 1.1 2019 09:45 AM     Bilirubin:   Lab Results   Component Value Date/Time    BILITOT 1.0 2019 09:45 AM     AFP Last 3 each if available:   Lab Results   Component Value Date/Time    AFP 49 (H) 2019 09:45 AM       MELD: MELD-Na score: 8 at 2019  9:45 AM  MELD score: 8 at 2019  9:45 AM  Calculated from:  Serum Creatinine: 1.1 mg/dL at  7/29/2019  9:45 AM  Serum Sodium: 133 mmol/L at 7/29/2019  9:45 AM  Total Bilirubin: 1.0 mg/dL at 7/29/2019  9:45 AM  INR(ratio): 1.1 at 7/29/2019  9:45 AM  Age: 63 years    Plan: Committee discussion---Non-enhancing liver lesion with washout; concerning but indeterminate.  2.2 cm spiculated lung lesion by report; appears to be more outside the lung.  Possible hilum LN.  Plan: EUS for assess/bx of LN and MRI to further evaluate liver lesion.      Note forwarded to JB Villafana to coordinate      Follow-up Provider: Abril Porter MD

## 2019-08-02 ENCOUNTER — DOCUMENTATION ONLY (OUTPATIENT)
Dept: TRANSPLANT | Facility: CLINIC | Age: 64
End: 2019-08-02

## 2019-08-02 ENCOUNTER — TELEPHONE (OUTPATIENT)
Dept: TRANSPLANT | Facility: CLINIC | Age: 64
End: 2019-08-02

## 2019-08-02 DIAGNOSIS — K74.60 HEPATIC CIRRHOSIS, UNSPECIFIED HEPATIC CIRRHOSIS TYPE, UNSPECIFIED WHETHER ASCITES PRESENT: Primary | ICD-10-CM

## 2019-08-02 NOTE — NURSING
Pt aware of need for lung bx and should expect a call. Also sent referral to [facility] for appt. Pt reports no history of PAP or Mammogram.

## 2019-08-06 ENCOUNTER — TELEPHONE (OUTPATIENT)
Dept: PULMONOLOGY | Facility: CLINIC | Age: 64
End: 2019-08-06

## 2019-08-06 DIAGNOSIS — R59.0 MEDIASTINAL ADENOPATHY: Primary | ICD-10-CM

## 2019-08-07 ENCOUNTER — HOSPITAL ENCOUNTER (OUTPATIENT)
Facility: OTHER | Age: 64
Discharge: HOME | End: 2019-08-07
Attending: RADIOLOGY | Admitting: RADIOLOGY
Payer: MEDICAID

## 2019-08-07 VITALS
DIASTOLIC BLOOD PRESSURE: 83 MMHG | BODY MASS INDEX: 23.9 KG/M2 | HEART RATE: 90 BPM | RESPIRATION RATE: 18 BRPM | WEIGHT: 140 LBS | HEIGHT: 64 IN | TEMPERATURE: 98 F | SYSTOLIC BLOOD PRESSURE: 116 MMHG | OXYGEN SATURATION: 96 %

## 2019-08-07 DIAGNOSIS — R18.8 OTHER ASCITES: ICD-10-CM

## 2019-08-07 RX ORDER — MULTIVITAMIN
1 TABLET ORAL DAILY
COMMUNITY

## 2019-08-07 RX ORDER — ERGOCALCIFEROL 1.25 MG/1
50000 CAPSULE ORAL
COMMUNITY

## 2019-08-07 NOTE — DISCHARGE INSTRUCTIONS
Discharge Instructions for Paracentesis  Paracentesis is a procedure to remove extra fluid from your belly (abdomen). This fluid buildup in the abdomen is called ascites. The procedure may have been done to take a sample of the fluid. Or, it may have been done to drain the extra fluid from your abdomen and help make you more comfortable.       Ascites is buildup of excess fluid in the abdomen.     Home care  · If you have pain after the procedure, your healthcare provider can prescribe or recommend pain medicines. Take these exactly as directed. If you stopped taking other medicines before the procedure, ask your provider when you can start them again.  · Take it easy for 24 hours after the procedure. Avoid physical activity until your provider says its OK.  · You will have a small bandage over the puncture site. Stitches (sutures), surgical staples, adhesive tapes, adhesive strips, or surgical glue may be used to close the incision. They also help stop bleeding and speed healing. You may take the bandage off in 24 hours.  · Check the puncture site for the signs of infection listed below.    Follow-up care  Make a follow-up appointment with your healthcare provider as directed. During your follow-up visit, your provider will check your healing. Let your provider know how you are feeling. You can also discuss the cause of your ascites and if you need any further treatment.    When to seek medical advice  Call your healthcare provider if you have any of the following after the procedure:  · A fever of 100.4°F (38.0°C) or higher  · Trouble breathing  · Pain that doesn't go away even after taking pain medicine  · Belly pain not caused by having the skin punctured  · Bleeding from the puncture site  · More than a small amount of fluid leaking from the puncture site  · Swollen belly  · Signs of infection at the puncture site. These include increased pain, redness, or swelling, warmth, or bad-smelling drainage.  · Blood  in your urine  · Feeling dizzy or lightheaded, or fainting       Anesthesia: Monitored Anesthesia Care (MAC)    Anesthesia Safety  · Have an adult family member or friend drive you home after the procedure.  · For the first 24 hours after your surgery:  ¨ Do not drive or use heavy equipment.  ¨ Do not make important decisions or sign documents.  ¨ Avoid alcohol.  ¨ Have someone stay with you, if possible. They can watch for problems and help keep you safe.    PLEASE FOLLOW ANY OTHER INSTRUCTIONS PROVIDED TO YOU BY DR. FONTANA!

## 2019-08-07 NOTE — DISCHARGE SUMMARY
Radiology Discharge Summary      Admit date: 8/7/2019  7:35 AM  Discharge date: August 7, 2019    Instructions Given to patient: YesVerbal    Diet: Regular    Activity:NO Restrictions    Medications on discharge (List): Refer to Discharge Medication List    Hospital Course: Patient referred for paracentesis. No abdominal fluid present. Patient reports that abdominal distention resolved with use of diuretics.    Description of Condition on Discharge: stable    Discharge Disposition: Home    Discharge Diagnosis: ascites, resolved

## 2019-08-07 NOTE — H&P
[facility] Cath Lab [facility] 1  History & Physical - Short Stay  Interventional Radiology    SUBJECTIVE:     Chief Complaint/Reason for Admission: Ascites    Informant(s):  self and Electronic Health Record    History of Present Illness:  Mar Jin is a 63 y.o. female with a history of ascites.      Patient presents for paracentesis.    Scheduled Meds:   Continuous Infusions:   PRN Meds:     Review of patient's allergies indicates:   Allergen Reactions    Codeine Nausea Only       Past Medical History:   Diagnosis Date    Aortic atherosclerosis 7/21/2019    Cirrhosis 7/19/2019    Dermatitis, eczematoid 7/19/2019    Hepatitis C     Hepatitis C virus infection 7/19/2019    Hepatoma 7/29/2019    History of acute pancreatitis 7/21/2019    2018    HTN (hypertension) 7/19/2019    Hyperlipidemia 7/19/2019    Hypertension     Neuropathy     Other ascites 7/19/2019    Polyneuropathy 7/19/2019    Vitamin D deficiency 7/19/2019     History reviewed. No pertinent surgical history.  Family History   Problem Relation Age of Onset    Lung disease Mother     Heart disease Father     Hypertension Sister     HIV Brother     HIV Brother     Hypertension Sister      Social History     Tobacco Use    Smoking status: Current Every Day Smoker    Smokeless tobacco: Never Used   Substance Use Topics    Alcohol use: Yes     Comment: social    Drug use: Yes     Types: Marijuana     Comment: marijuana        Review of Systems:  ROS not obtained.    OBJECTIVE:     Vital Signs (Most Recent):  Temp: 98.5 °F (36.9 °C) (08/07/19 0820)  Pulse: 81 (08/07/19 0820)  Resp: 18 (08/07/19 0820)  BP: 121/86 (08/07/19 0820)  SpO2: 99 % (08/07/19 0820)    Physical Exam:  alert and oriented    Laboratory  CBC:   Lab Results   Component Value Date/Time    WBC 7.46 07/29/2019 09:45 AM    RBC 4.17 07/29/2019 09:45 AM    HGB 12.1 07/29/2019 09:45 AM    HCT 36.3 (L) 07/29/2019 09:45 AM     (L) 07/29/2019 09:45 AM    MCV 87  07/29/2019 09:45 AM    MCH 29.0 07/29/2019 09:45 AM    MCHC 33.3 07/29/2019 09:45 AM     Coagulation:   Lab Results   Component Value Date/Time    INR 1.1 07/29/2019 09:45 AM    APTT 37.8 (H) 01/09/2015 02:24 PM           ASSESSMENT/PLAN:     Ascites.    Patient will undergo paracentesis.      Sedation Plan: 1% lidocaine local anesthesia

## 2019-08-07 NOTE — PLAN OF CARE
Mar Jin has met all discharge criteria from Phase II. Vital Signs are stable, ambulating  without difficulty. Discharge instructions given, patient verbalized understanding. Discharged from facility via wheelchair in stable condition.

## 2019-08-16 ENCOUNTER — OFFICE VISIT (OUTPATIENT)
Dept: TRANSPLANT | Facility: CLINIC | Age: 64
End: 2019-08-16
Payer: MEDICAID

## 2019-08-16 VITALS
OXYGEN SATURATION: 100 % | HEART RATE: 80 BPM | WEIGHT: 134.5 LBS | BODY MASS INDEX: 22.96 KG/M2 | HEIGHT: 64 IN | TEMPERATURE: 98 F | DIASTOLIC BLOOD PRESSURE: 97 MMHG | SYSTOLIC BLOOD PRESSURE: 140 MMHG | RESPIRATION RATE: 17 BRPM

## 2019-08-16 DIAGNOSIS — C22.0 HEPATOMA: ICD-10-CM

## 2019-08-16 DIAGNOSIS — R77.2 HIGH ALPHA FETOPROTEIN (AFP) TUMOR MARKER: ICD-10-CM

## 2019-08-16 DIAGNOSIS — R91.1 LESION OF LUNG: ICD-10-CM

## 2019-08-16 DIAGNOSIS — R18.8 OTHER ASCITES: Primary | ICD-10-CM

## 2019-08-16 DIAGNOSIS — B19.20 HEPATITIS C VIRUS INFECTION WITHOUT HEPATIC COMA, UNSPECIFIED CHRONICITY: ICD-10-CM

## 2019-08-16 DIAGNOSIS — K74.60 HEPATIC CIRRHOSIS, UNSPECIFIED HEPATIC CIRRHOSIS TYPE, UNSPECIFIED WHETHER ASCITES PRESENT: ICD-10-CM

## 2019-08-16 PROCEDURE — 99215 OFFICE O/P EST HI 40 MIN: CPT | Mod: S$PBB,TXP,, | Performed by: INTERNAL MEDICINE

## 2019-08-16 PROCEDURE — 99999 PR PBB SHADOW E&M-EST. PATIENT-LVL IV: CPT | Mod: PBBFAC,TXP,, | Performed by: INTERNAL MEDICINE

## 2019-08-16 PROCEDURE — 99214 OFFICE O/P EST MOD 30 MIN: CPT | Mod: PBBFAC,PN,NTX | Performed by: INTERNAL MEDICINE

## 2019-08-16 PROCEDURE — 99215 PR OFFICE/OUTPT VISIT, EST, LEVL V, 40-54 MIN: ICD-10-PCS | Mod: S$PBB,TXP,, | Performed by: INTERNAL MEDICINE

## 2019-08-16 PROCEDURE — 99999 PR PBB SHADOW E&M-EST. PATIENT-LVL IV: ICD-10-PCS | Mod: PBBFAC,TXP,, | Performed by: INTERNAL MEDICINE

## 2019-08-16 RX ORDER — SPIRONOLACTONE 50 MG/1
50 TABLET, FILM COATED ORAL DAILY
Qty: 30 TABLET | Refills: 11 | Status: SHIPPED | OUTPATIENT
Start: 2019-08-16

## 2019-08-16 RX ORDER — FUROSEMIDE 40 MG/1
40 TABLET ORAL DAILY
Qty: 30 TABLET | Refills: 11 | Status: SHIPPED | OUTPATIENT
Start: 2019-08-16

## 2019-08-16 NOTE — PATIENT INSTRUCTIONS
1. Labs today  2. Lung nodule - agree with consult and biopsy of lung spot  3. MRI to further evaluate the liver spot  4. Liver working well.  5. Labs today to make sure we dont have to back off the water pills  6. If itchy then I will give you cholestyramine to mop up the bile acids  7. Decrease lasix to 40 mg daily and spironolactone to 50 mg daily- you can take both in the morning  8 due to low BPs stop the hydrodiuril; monitor BP twice a day  Return 4 weeks

## 2019-08-16 NOTE — LETTER
August 18, 2019        Arianne Burgess  [address]  [facility] [address]  Phone: [phone]  Fax: [phone]             [facility] Trans[facility]  [address] [facility] [facility] [address]  Phone: [phone]  Fax: [phone]   Patient: Mar Jin   MR Number: [id number]   YOB: 1955   Date of Visit: 8/16/2019       Dear Dr. Arianne Burgess    Thank you for referring Mar Jin to me for evaluation. Attached you will find relevant portions of my assessment and plan of care.    If you have questions, please do not hesitate to call me. I look forward to following Mar Jin along with you.    Sincerely,    Abril Porter MD    Enclosure    If you would like to receive this communication electronically, please contact [email or web address] or [phone] to request [employer] Link access.    [employer] Link is a tool which provides read-only access to select patient information with whom you have a relationship. Its easy to use and provides real time access to review your patients record including encounter summaries, notes, results, and demographic information.    If you feel you have received this communication in error or would no longer like to receive these types of communications, please e-mail [email or web address]

## 2019-08-16 NOTE — PROGRESS NOTES
HEPATOLOGY FOLLOW UP    Referring Physician: [facility]  Current Corresponding Physician: [facility]mitt    Mar Jin is here for follow up of Cirrhosis      HPI  Since Mar Jin's last visit she had a ct chest/abdomen/pelvis. There is a 4 cm liver lesion that is indeterminate. She also has a 2.2 cm spiculated lung lesion. She will see pulmonary for a biopsy of the lesion.    She started the diuretics I prescribed and did not have enough fluid to tap. She has lost 20 lbs since starting the diuretics. She otherwise is feeling well and denies symptoms to suggest HE.    Outpatient Encounter Medications as of 8/16/2019   Medication Sig Dispense Refill    amLODIPine (NORVASC) 10 MG tablet Take 10 mg by mouth once daily.      ergocalciferol (ERGOCALCIFEROL) 50,000 unit Cap Take 50,000 Units by mouth every 7 days.      furosemide (LASIX) 40 MG tablet Take 1 tablet (40 mg total) by mouth 2 (two) times daily. 60 tablet 11    hydroCHLOROthiazide (HYDRODIURIL) 25 MG tablet Take 25 mg by mouth once daily.      multivitamin (ONE DAILY MULTIVITAMIN) per tablet Take 1 tablet by mouth once daily.      ondansetron (ZOFRAN) 4 MG tablet Take 1 tablet (4 mg total) by mouth every 6 (six) hours. 12 tablet 0    spironolactone (ALDACTONE) 50 MG tablet Take 2 tablets (100 mg total) by mouth once daily. 60 tablet 11    venlafaxine (EFFEXOR-XR) 37.5 MG 24 hr capsule        No facility-administered encounter medications on file as of 8/16/2019.      Review of patient's allergies indicates:   Allergen Reactions    Codeine Nausea Only     Past Medical History:   Diagnosis Date    Aortic atherosclerosis 7/21/2019    Cirrhosis 7/19/2019    Dermatitis, eczematoid 7/19/2019    Hepatitis C     Hepatitis C virus infection 7/19/2019    Hepatoma 7/29/2019    History of acute pancreatitis 7/21/2019    2018    HTN (hypertension) 7/19/2019    Hyperlipidemia 7/19/2019    Hypertension      Neuropathy     Other ascites 7/19/2019    Polyneuropathy 7/19/2019    Vitamin D deficiency 7/19/2019       Review of Systems   HENT: Negative.    Eyes: Negative.    Respiratory: Negative.    Cardiovascular: Negative.    Gastrointestinal: Negative.    Genitourinary: Negative.    Musculoskeletal: Negative.    Skin: Negative.    Neurological: Negative.    Psychiatric/Behavioral: Negative.      Vitals:    08/16/19 1418   BP: (!) 140/97   Pulse: 80   Resp: 17   Temp: 98 °F (36.7 °C)       Physical Exam   Constitutional: She is oriented to person, place, and time. She appears well-developed and well-nourished.   HENT:   Head: Normocephalic.   Eyes: Pupils are equal, round, and reactive to light. No scleral icterus.   Neck: Neck supple. No thyromegaly present.   Cardiovascular: Normal rate, regular rhythm and normal heart sounds.   Pulmonary/Chest: Effort normal and breath sounds normal. She has no wheezes.   Abdominal: Soft. She exhibits no distension and no mass. There is no tenderness.   Musculoskeletal: Normal range of motion. She exhibits no edema.   Neurological: She is oriented to person, place, and time.   Skin: Skin is warm and dry. No rash noted.   Psychiatric: She has a normal mood and affect.   Vitals reviewed.      MELD-Na score: 8 at 7/29/2019  9:45 AM  MELD score: 8 at 7/29/2019  9:45 AM  Calculated from:  Serum Creatinine: 1.1 mg/dL at 7/29/2019  9:45 AM  Serum Sodium: 133 mmol/L at 7/29/2019  9:45 AM  Total Bilirubin: 1.0 mg/dL at 7/29/2019  9:45 AM  INR(ratio): 1.1 at 7/29/2019  9:45 AM  Age: 63 years    Lab Results   Component Value Date     (H) 07/29/2019    BUN 27 (H) 07/29/2019    CREATININE 1.1 07/29/2019    CALCIUM 9.9 07/29/2019     (L) 07/29/2019    K 3.5 07/29/2019    CL 92 (L) 07/29/2019    PROT 9.4 (H) 07/29/2019    CO2 28 07/29/2019    ANIONGAP 13 07/29/2019    WBC 7.46 07/29/2019    RBC 4.17 07/29/2019    HGB 12.1 07/29/2019    HCT 36.3 (L) 07/29/2019    MCV 87 07/29/2019     MCH 29.0 07/29/2019    MCHC 33.3 07/29/2019     Lab Results   Component Value Date    RDW 14.1 07/29/2019     (L) 07/29/2019    MPV 12.5 07/29/2019    GRAN 3.6 07/29/2019    GRAN 48.5 07/29/2019    LYMPH 2.7 07/29/2019    LYMPH 35.5 07/29/2019    MONO 1.0 07/29/2019    MONO 12.9 07/29/2019    EOSINOPHIL 2.3 07/29/2019    BASOPHIL 0.5 07/29/2019    EOS 0.2 07/29/2019    BASO 0.04 07/29/2019    APTT 37.8 (H) 01/09/2015    ALBUMIN 3.8 07/29/2019    AST 68 (H) 07/29/2019    ALT 30 07/29/2019    ALKPHOS 159 (H) 07/29/2019    MG 2.0 01/09/2015    LABPROT 12.8 (H) 07/29/2019    INR 1.1 07/29/2019       Assessment and Plan:    Mar Jin is a 63 y.o. female withCirrhosis  Current recommendations:  1. Liver lesion, indeterminate: proceed with MRI with and without gadolinium. Monitor AFP (49 on 7/29/19).   Suspect HCC. Hold liver transplant evaluation until have results of lung biopsy  2. Lung lesion: proceed with biopsy  3. Ascites and edema, ongoing: continue diuretics but lower lasix to 40 mg dialiy and spironolactone 50 mg daily  4. Pruritus: bile acids are elevated: if pruritus recurs, then will give cholestyramine    Return 4 weeks

## 2019-08-18 PROBLEM — R91.1 LESION OF LUNG: Status: ACTIVE | Noted: 2019-08-18

## 2019-08-21 ENCOUNTER — OFFICE VISIT (OUTPATIENT)
Dept: PULMONOLOGY | Facility: CLINIC | Age: 64
End: 2019-08-21
Payer: MEDICAID

## 2019-08-21 VITALS
OXYGEN SATURATION: 99 % | BODY MASS INDEX: 24.17 KG/M2 | DIASTOLIC BLOOD PRESSURE: 77 MMHG | HEART RATE: 63 BPM | HEIGHT: 64 IN | SYSTOLIC BLOOD PRESSURE: 120 MMHG | WEIGHT: 141.56 LBS

## 2019-08-21 DIAGNOSIS — R91.8 RIGHT LOWER LOBE LUNG MASS: Primary | ICD-10-CM

## 2019-08-21 DIAGNOSIS — R77.2 ELEVATED AFP: ICD-10-CM

## 2019-08-21 DIAGNOSIS — J43.8 OTHER EMPHYSEMA: ICD-10-CM

## 2019-08-21 PROCEDURE — 99213 OFFICE O/P EST LOW 20 MIN: CPT | Mod: PBBFAC,TXP | Performed by: INTERNAL MEDICINE

## 2019-08-21 PROCEDURE — 99999 PR PBB SHADOW E&M-EST. PATIENT-LVL III: CPT | Mod: PBBFAC,TXP,, | Performed by: INTERNAL MEDICINE

## 2019-08-21 PROCEDURE — 99999 PR PBB SHADOW E&M-EST. PATIENT-LVL III: ICD-10-PCS | Mod: PBBFAC,TXP,, | Performed by: INTERNAL MEDICINE

## 2019-08-21 PROCEDURE — 99204 OFFICE O/P NEW MOD 45 MIN: CPT | Mod: S$PBB,TXP,, | Performed by: INTERNAL MEDICINE

## 2019-08-21 PROCEDURE — 99204 PR OFFICE/OUTPT VISIT, NEW, LEVL IV, 45-59 MIN: ICD-10-PCS | Mod: S$PBB,TXP,, | Performed by: INTERNAL MEDICINE

## 2019-08-21 RX ORDER — CHOLESTYRAMINE 4 G/9G
4 POWDER, FOR SUSPENSION ORAL 2 TIMES DAILY
Qty: 60 PACKET | Refills: 3 | Status: SHIPPED | OUTPATIENT
Start: 2019-08-21

## 2019-08-21 RX ORDER — CHOLESTYRAMINE 4 G/9G
4 POWDER, FOR SUSPENSION ORAL 2 TIMES DAILY
COMMUNITY
End: 2019-08-21 | Stop reason: SDUPTHER

## 2019-08-21 NOTE — LETTER
August 21, 2019      Abril Porter MD  [address]  [facility] [address]           [facility]  [address]  [facility] [address]  Phone: [phone]          Patient: Mar Jin   MR Number: [id number]   YOB: 1955   Date of Visit: 8/21/2019       Dear Dr. Abril Porter:    Thank you for referring Mar Jin to me for evaluation. Attached you will find relevant portions of my assessment and plan of care.    If you have questions, please do not hesitate to call me. I look forward to following Mar Jin along with you.    Sincerely,    Renee Merino MD    Enclosure  CC:  No Recipients    If you would like to receive this communication electronically, please contact [email or web address] or [phone] to request more information on [employer] Link access.    For providers and/or their staff who would like to refer a patient to [facility], please contact us through our one-stop-shop provider referral line, [facility], at [phone].    If you feel you have received this communication in error or would no longer like to receive these types of communications, please e-mail [email or web address]

## 2019-08-21 NOTE — H&P (VIEW-ONLY)
"Subjective:       Patient ID: Mar Jin is a 63 y.o. female.  Consult from Dr. Porter  Chief Complaint: Ebus consult    63 year old current smoker who is down to 1 cigarette a day.  Exercise is limited by knee pain.  Patient with cirrhosis and HCC.  Screening for revealed a paraesophageal/RLL mass and is here for an EBUS evaluation.    Review of Systems   Constitutional: Negative for weight gain.   HENT: Negative for trouble swallowing.    Respiratory: Positive for wheezing (on her right side). Negative for cough and dyspnea on extertion.    Cardiovascular: Negative for chest pain and leg swelling.   Endocrine: Negative for cold intolerance and heat intolerance.    Musculoskeletal: Negative for arthralgias.   Gastrointestinal: Positive for abdominal distention. Negative for acid reflux.   Neurological: Negative for headaches.        Neuropathy   Hematological: Negative for adenopathy.   Psychiatric/Behavioral: Negative for confusion.       Past medical and surgical history reviewed.  Social and family history reviewed.  Allergies and medications reviewed.  No personal or family history of anesthesia complications.    Objective:       Vitals:    08/21/19 1504   BP: 120/77   BP Location: Left arm   Patient Position: Sitting   Pulse: 63   SpO2: 99%   Weight: 64.2 kg (141 lb 8.6 oz)   Height: 5' 4" (1.626 m)     Physical Exam   Constitutional: She is oriented to person, place, and time. She appears well-developed and well-nourished.   HENT:   Head: Normocephalic.   Nose: Nose normal.   Neck: Normal range of motion. Neck supple. No tracheal deviation present.   Cardiovascular: Normal rate, regular rhythm, normal heart sounds and intact distal pulses.   Pulmonary/Chest: Normal expansion, symmetric chest wall expansion, effort normal and breath sounds normal.   Abdominal: Soft. Bowel sounds are normal. There is no hepatosplenomegaly.   Musculoskeletal: Normal range of motion. She exhibits no edema.   Lymphadenopathy: " No supraclavicular adenopathy is present.     She has no cervical adenopathy.   Neurological: She is alert and oriented to person, place, and time. No cranial nerve deficit.   Skin: Skin is warm and dry.   Psychiatric: She has a normal mood and affect.        Personal Diagnostic Review  CT of chest performed on 7/29/2019 without contrast revealed RLL mass and emphysematous changes..  Lab Results   Component Value Date    INR 1.1 07/29/2019    INR 1.1 01/09/2015     Lab Results   Component Value Date    WBC 7.46 07/29/2019    HGB 12.1 07/29/2019    HCT 36.3 (L) 07/29/2019    MCV 87 07/29/2019     (L) 07/29/2019       No flowsheet data found.      Assessment:       1. Elevated AFP    2. Right lower lobe lung mass    3. Other emphysema        Outpatient Encounter Medications as of 8/21/2019   Medication Sig Dispense Refill    amLODIPine (NORVASC) 10 MG tablet Take 10 mg by mouth once daily.      ergocalciferol (ERGOCALCIFEROL) 50,000 unit Cap Take 50,000 Units by mouth every 7 days.      furosemide (LASIX) 40 MG tablet Take 1 tablet (40 mg total) by mouth once daily. 30 tablet 11    multivitamin (ONE DAILY MULTIVITAMIN) per tablet Take 1 tablet by mouth once daily.      ondansetron (ZOFRAN) 4 MG tablet Take 1 tablet (4 mg total) by mouth every 6 (six) hours. 12 tablet 0    spironolactone (ALDACTONE) 50 MG tablet Take 1 tablet (50 mg total) by mouth once daily. 30 tablet 11    venlafaxine (EFFEXOR-XR) 37.5 MG 24 hr capsule        No facility-administered encounter medications on file as of 8/21/2019.      No orders of the defined types were placed in this encounter.    Plan:     Problem List Items Addressed This Visit     Elevated AFP - Primary    Overview     54         Right lower lobe lung mass    Overview     Parabronchial and esophageal.  Diagnostic and staging bronchoscopy with EBUS on 8/27.    I have explained the risks, benefits and alternatives of the procedure in detail.  The patient voices  understanding and all questions have been answered.  The patient agrees to proceed as planned.           Other emphysema    Overview     Asymptomatic.  Can obtain PFTs prior to LTS>

## 2019-08-21 NOTE — TELEPHONE ENCOUNTER
Abril Porter MD  [facility] Pre-Liver Transplant Clinical   Caller: PT             Please fill cholestyramine for her      Routed to Dr. Porter to sign. Pt notified.

## 2019-08-26 ENCOUNTER — ANESTHESIA EVENT (OUTPATIENT)
Dept: SURGERY | Facility: HOSPITAL | Age: 64
End: 2019-08-26
Payer: MEDICAID

## 2019-08-27 ENCOUNTER — ANESTHESIA (OUTPATIENT)
Dept: SURGERY | Facility: HOSPITAL | Age: 64
End: 2019-08-27
Payer: MEDICAID

## 2019-08-27 PROCEDURE — 63600175 PHARM REV CODE 636 W HCPCS: Mod: TXP | Performed by: NURSE ANESTHETIST, CERTIFIED REGISTERED

## 2019-08-27 PROCEDURE — D9220A PRA ANESTHESIA: ICD-10-PCS | Mod: TXP,,, | Performed by: ANESTHESIOLOGY

## 2019-08-27 PROCEDURE — 25000003 PHARM REV CODE 250: Mod: TXP | Performed by: NURSE ANESTHETIST, CERTIFIED REGISTERED

## 2019-08-27 PROCEDURE — D9220A PRA ANESTHESIA: Mod: TXP,,, | Performed by: ANESTHESIOLOGY

## 2019-08-27 RX ORDER — ROCURONIUM BROMIDE 10 MG/ML
INJECTION, SOLUTION INTRAVENOUS
Status: DISCONTINUED | OUTPATIENT
Start: 2019-08-27 | End: 2019-08-27

## 2019-08-27 RX ORDER — PROPOFOL 10 MG/ML
VIAL (ML) INTRAVENOUS CONTINUOUS PRN
Status: DISCONTINUED | OUTPATIENT
Start: 2019-08-27 | End: 2019-08-27

## 2019-08-27 RX ORDER — LIDOCAINE HCL/PF 100 MG/5ML
SYRINGE (ML) INTRAVENOUS
Status: DISCONTINUED | OUTPATIENT
Start: 2019-08-27 | End: 2019-08-27

## 2019-08-27 RX ORDER — KETAMINE HCL IN 0.9 % NACL 50 MG/5 ML
SYRINGE (ML) INTRAVENOUS
Status: DISCONTINUED | OUTPATIENT
Start: 2019-08-27 | End: 2019-08-27

## 2019-08-27 RX ORDER — FENTANYL CITRATE 50 UG/ML
INJECTION, SOLUTION INTRAMUSCULAR; INTRAVENOUS
Status: DISCONTINUED | OUTPATIENT
Start: 2019-08-27 | End: 2019-08-27

## 2019-08-27 RX ORDER — MIDAZOLAM HYDROCHLORIDE 1 MG/ML
INJECTION, SOLUTION INTRAMUSCULAR; INTRAVENOUS
Status: DISCONTINUED | OUTPATIENT
Start: 2019-08-27 | End: 2019-08-27

## 2019-08-27 RX ORDER — PROPOFOL 10 MG/ML
VIAL (ML) INTRAVENOUS
Status: DISCONTINUED | OUTPATIENT
Start: 2019-08-27 | End: 2019-08-27

## 2019-08-27 RX ADMIN — Medication 30 MG: at 08:08

## 2019-08-27 RX ADMIN — PROPOFOL 150 MCG/KG/MIN: 10 INJECTION, EMULSION INTRAVENOUS at 08:08

## 2019-08-27 RX ADMIN — ROCURONIUM BROMIDE 20 MG: 10 INJECTION, SOLUTION INTRAVENOUS at 09:08

## 2019-08-27 RX ADMIN — PROPOFOL 100 MG: 10 INJECTION, EMULSION INTRAVENOUS at 08:08

## 2019-08-27 RX ADMIN — SODIUM CHLORIDE, SODIUM GLUCONATE, SODIUM ACETATE, POTASSIUM CHLORIDE, MAGNESIUM CHLORIDE, SODIUM PHOSPHATE, DIBASIC, AND POTASSIUM PHOSPHATE: .53; .5; .37; .037; .03; .012; .00082 INJECTION, SOLUTION INTRAVENOUS at 09:08

## 2019-08-27 RX ADMIN — LIDOCAINE HYDROCHLORIDE 100 MG: 20 INJECTION, SOLUTION INTRAVENOUS at 08:08

## 2019-08-27 RX ADMIN — MIDAZOLAM HYDROCHLORIDE 2 MG: 1 INJECTION, SOLUTION INTRAMUSCULAR; INTRAVENOUS at 08:08

## 2019-08-27 RX ADMIN — FENTANYL CITRATE 100 MCG: 50 INJECTION, SOLUTION INTRAMUSCULAR; INTRAVENOUS at 08:08

## 2019-08-27 RX ADMIN — SUGAMMADEX 100 MG: 100 INJECTION, SOLUTION INTRAVENOUS at 09:08

## 2019-08-27 NOTE — TRANSFER OF CARE
"Anesthesia Transfer of Care Note    Patient: Mar Jin    Procedure(s) Performed: Procedure(s) (LRB):  ENDOBRONCHIAL ULTRASOUND (EBUS) (N/A)    Patient location: PACU    Anesthesia Type: general    Transport from OR: Transported from OR on 6-10 L/min O2 by face mask with adequate spontaneous ventilation    Post pain: adequate analgesia    Post assessment: no apparent anesthetic complications and tolerated procedure well    Post vital signs: stable    Level of consciousness: sedated    Nausea/Vomiting: no nausea/vomiting    Complications: none    Transfer of care protocol was followed      Last vitals:   Visit Vitals  /75 (BP Location: Left arm, Patient Position: Lying)   Pulse 85   Temp 36.7 °C (98.1 °F)   Resp (!) 22   Ht 5' 4" (1.626 m)   Wt 64 kg (141 lb)   SpO2 97%   Breastfeeding? No   BMI 24.20 kg/m²     "

## 2019-08-27 NOTE — ANESTHESIA POSTPROCEDURE EVALUATION
Anesthesia Post Evaluation    Patient: Mar Jin    Procedure(s) Performed: Procedure(s) (LRB):  ENDOBRONCHIAL ULTRASOUND (EBUS) (N/A)    Final Anesthesia Type: general  Patient location during evaluation: PACU  Patient participation: Yes- Able to Participate  Level of consciousness: awake and alert and oriented  Post-procedure vital signs: reviewed and stable  Pain management: adequate  Airway patency: patent  PONV status at discharge: No PONV  Anesthetic complications: no      Cardiovascular status: blood pressure returned to baseline and hemodynamically stable  Respiratory status: unassisted  Hydration status: euvolemic  Follow-up not needed.          Vitals Value Taken Time   /83 8/27/2019 10:30 AM   Temp 36.7 °C (98.1 °F) 8/27/2019 10:04 AM   Pulse 95 8/27/2019 10:30 AM   Resp 20 8/27/2019 10:30 AM   SpO2 97 % 8/27/2019 10:30 AM         No case tracking events are documented in the log.      Pain/Bernarda Score: Bernarda Score: 9 (8/27/2019 10:20 AM)

## 2019-08-27 NOTE — ANESTHESIA PREPROCEDURE EVALUATION
08/27/2019  Mar Jin is a 63 y.o., female with PMH of HCC cirrhosis who is scheduled for EBUS. Paraeophageal and RLL mass found during screening for liver tx.     Anesthesia Evaluation    I have reviewed the Patient Summary Reports.    I have reviewed the Nursing Notes.      Review of Systems  Anesthesia Hx:  No previous Anesthesia  Neg history of prior surgery. Denies Family Hx of Anesthesia complications.    Social:  Smoker    Hematology/Oncology:  Hematology Normal        EENT/Dental:EENT/Dental Normal   Cardiovascular:   Hypertension    Pulmonary:   COPD    Hepatic/GI:   Liver Disease, Hepatitis        Physical Exam  General:  Well nourished    Airway/Jaw/Neck:  Airway Findings: Mouth Opening: Normal Tongue: Normal  General Airway Assessment: Adult  Mallampati: I        Eyes/Ears/Nose:  EYES/EARS/NOSE FINDINGS: Normal   Dental:  Dental Findings: Edentulous   Chest/Lungs:  Chest/Lungs Clear    Heart/Vascular:  Heart Findings: Normal Heart murmur: negative       Mental Status:  Mental Status Findings: Normal        Anesthesia Plan  Type of Anesthesia, risks & benefits discussed:  Anesthesia Type:  general  Patient's Preference:   Intra-op Monitoring Plan: standard ASA monitors  Intra-op Monitoring Plan Comments:   Post Op Pain Control Plan:   Post Op Pain Control Plan Comments:   Induction:   IV  Beta Blocker:  Patient is not currently on a Beta-Blocker (No further documentation required).       Informed Consent: Patient understands risks and agrees with Anesthesia plan.  Questions answered. Anesthesia consent signed with patient.  ASA Score: 3     Day of Surgery Review of History & Physical: I have interviewed and examined the patient. I have reviewed the patient's H&P dated:  There are no significant changes.          Ready For Surgery From Anesthesia Perspective.

## 2019-09-04 ENCOUNTER — DOCUMENTATION ONLY (OUTPATIENT)
Dept: CARDIOTHORACIC SURGERY | Facility: HOSPITAL | Age: 64
End: 2019-09-04

## 2019-09-04 ENCOUNTER — TELEPHONE (OUTPATIENT)
Dept: ENDOSCOPY | Facility: HOSPITAL | Age: 64
End: 2019-09-04

## 2019-09-04 DIAGNOSIS — R93.89 ABNORMAL FINDING ON IMAGING: Primary | ICD-10-CM

## 2019-09-04 NOTE — PATIENT CARE CONFERENCE
[facility]      THORACIC MULTIDISCIPLINARY TUMOR BOARD  PATIENT REVIEW FORM  ________________________________________________________________________    CLINIC #: [id number]  DATE: 09/04/2019    DIAGNOSIS: Lung Mass    PRESENTER: Dr. Merino    PATIENT SUMMARY: 63 year old female smoker with Hepatitis C and Cirrhosis presented to pulmonology for work up of right lower lobe mass incidentally found on CT imaging for her Hepatologist. Mass is abutting the mediastinum immediately posterior to the bronchus intermedius thus bronchoscopy with EBUS was recommended for biopsy.  Levels 11L and 7 were sampled and were negative for malignancy. Pathology states benign and reactive bronchial cells.     BOARD RECOMMENDATIONS: Recommend EUS for better characterization and biopsy of paraesophageal mass. Surgical resection would require a lobectomy thus would prefer a diagnosis prior to resection.     CONSULT NEEDED:     [] Surgery    [] Hem/Onc    [] Rad/Onc    [] Dietary                 [] Social Service    [] Psychology       [] Pulmonology  [x] Advanced Endoscopy     Clinical Stage: Tumor  Node(s)  Metastasis     Pathologic Stage: Tumor  Node(s)  Metastasis     GROUP STAGE:     [] O    [] 1A    [] IB    [] IIA    [] IIB     [] IIIA     [] IIIB     [] IIIC    [] IV                               [] Local recurrence     [] Regional recurrence     [] Distant recurrence                   [] NSCLC     [] SCLC     Tumor type-      Unstageable:      [] Yes     [] No  Metastatic site(s):          [x] Agnieszka'l Treatment Guidelines reviewed and care planned is consistent with guidelines.         (i.e., NCCN, NCI, PD, ACO, AUA, etc.)    PRESENTATION AT CANCER CONFERENCE:         [] Prospective    [] Retrospective     [] Follow-Up          [] Eligible for clinical trial

## 2019-09-04 NOTE — TELEPHONE ENCOUNTER
Message   Received: Today   Message Contents   MD Abril William MD; Renee Merino MD; Nelly Muniz MA; Hua Musa MD; Nilo Ray MD; 1 other   Cc: [facility] Pre-Liver Transplant Clinical             I think this may be reached with EUS-FNA.  At least worth a shot.     Rebekah- please arrange for an EUS FNA this week or next.  Can be a 60 min case.  Either Russell. Any AES MD. Thanks.      Please sign order

## 2019-09-11 ENCOUNTER — TELEPHONE (OUTPATIENT)
Dept: PULMONOLOGY | Facility: CLINIC | Age: 64
End: 2019-09-11

## 2019-09-11 NOTE — TELEPHONE ENCOUNTER
Called with results of EBUS being negative for cancer but without an alternative diagnosis.  Patient is scheduled EUS for Monday.       ----- Message from Renee Merino MD sent at 9/11/2019  2:31 PM CDT -----  Contact: Mar    tel:    [phone]       ----- Message -----  From: Millicent Busby MA  Sent: 9/11/2019   2:04 PM  To: Renee Merino MD        ----- Message -----  From: Floridalma Inez  Sent: 9/11/2019   2:01 PM  To: Wilber BAUTISTA Staff    Caller says she recently had a biopsy on her lung and she is looking for the test results.   Had this done last month on the 27th.      Pls call today.

## 2019-09-12 ENCOUNTER — TELEPHONE (OUTPATIENT)
Dept: ENDOSCOPY | Facility: HOSPITAL | Age: 64
End: 2019-09-12

## 2019-09-12 ENCOUNTER — TELEPHONE (OUTPATIENT)
Dept: TRANSPLANT | Facility: CLINIC | Age: 64
End: 2019-09-12

## 2019-09-12 DIAGNOSIS — Z76.82 ORGAN TRANSPLANT CANDIDATE: Primary | ICD-10-CM

## 2019-09-12 DIAGNOSIS — K74.60 HEPATIC CIRRHOSIS, UNSPECIFIED HEPATIC CIRRHOSIS TYPE, UNSPECIFIED WHETHER ASCITES PRESENT: Primary | ICD-10-CM

## 2019-09-12 NOTE — TELEPHONE ENCOUNTER
Called about UEUS scheduled 9/16/19 at 1000.  Left voicemail requesting call back.  CBC, PT/INR scheduled before procedure (liver cirrhosis).

## 2019-09-12 NOTE — TELEPHONE ENCOUNTER
Dr. Porter's plan from clinic appointment on 19:    Assessment and Plan:     Mar Jin is a 63 y.o. female withCirrhosis  Current recommendations:  1. Liver lesion, indeterminate: proceed with MRI with and without gadolinium. Monitor AFP (49 on 19).   Suspect HCC. Hold liver transplant evaluation until have results of lung biopsy  2. Lung lesion: proceed with biopsy  3. Ascites and edema, ongoing: continue diuretics but lower lasix to 40 mg dialiy and spironolactone 50 mg daily  4. Pruritus: bile acids are elevated: if pruritus recurs, then will give cholestyramine     Return 4 weeks    ________________________________________  Transplant coordinator called patient and discussed the followin. Patient had MRI and labs scheduled on 19 however patient was a no show.   Rescheduled MRI and labs for patient on 19.   2. Patient had lung biopsy done by Dr. Merino. Results discussed with patient.  Patient also has EUS scheduled on 19.  3. Patient states she is taking medication as directed by Dr. Porter.  4. Cholestyramine on patient's med list.     Patient states that she would like an appointment with Dr. Porter soon to discuss all test results. Patient explained that she would like to avoid transplant if possible because she is not sure if it would improve her quality of life. Patient does not want to be scheduled for transplant evaluation until she has appointment with Dr. Porter. First. Appt made with Dr. Porter on 19.  Patient will arrange her own transportation.

## 2019-09-13 ENCOUNTER — TELEPHONE (OUTPATIENT)
Dept: ENDOSCOPY | Facility: HOSPITAL | Age: 64
End: 2019-09-13

## 2019-09-13 NOTE — TELEPHONE ENCOUNTER
Spoke with patient about instructions for UEUS scheduled 9/16/19 at 1000.  CBC, PT/INR scheduled before procedure (liver cirrhosis).

## 2019-09-16 ENCOUNTER — ANESTHESIA (OUTPATIENT)
Dept: ENDOSCOPY | Facility: HOSPITAL | Age: 64
End: 2019-09-16
Payer: MEDICAID

## 2019-09-16 ENCOUNTER — HOSPITAL ENCOUNTER (OUTPATIENT)
Facility: HOSPITAL | Age: 64
Discharge: HOME | End: 2019-09-16
Attending: INTERNAL MEDICINE | Admitting: INTERNAL MEDICINE
Payer: MEDICAID

## 2019-09-16 ENCOUNTER — ANESTHESIA EVENT (OUTPATIENT)
Dept: ENDOSCOPY | Facility: HOSPITAL | Age: 64
End: 2019-09-16
Payer: MEDICAID

## 2019-09-16 VITALS
TEMPERATURE: 98 F | OXYGEN SATURATION: 100 % | SYSTOLIC BLOOD PRESSURE: 116 MMHG | RESPIRATION RATE: 20 BRPM | HEART RATE: 78 BPM | DIASTOLIC BLOOD PRESSURE: 73 MMHG | BODY MASS INDEX: 24.24 KG/M2 | WEIGHT: 142 LBS | HEIGHT: 64 IN

## 2019-09-16 DIAGNOSIS — J98.59 MEDIASTINAL MASS: Primary | ICD-10-CM

## 2019-09-16 PROCEDURE — 88305 CYTOLOGY SPECIMEN-FNA NON-RADIOLOGY CLINICIAN PERFORMED W/O ON SITE: ICD-10-PCS | Mod: 26,TXP,, | Performed by: PATHOLOGY

## 2019-09-16 PROCEDURE — 37000009 HC ANESTHESIA EA ADD 15 MINS: Mod: TXP | Performed by: INTERNAL MEDICINE

## 2019-09-16 PROCEDURE — D9220A PRA ANESTHESIA: Mod: TXP,,, | Performed by: ANESTHESIOLOGY

## 2019-09-16 PROCEDURE — 88342 CYTOLOGY SPECIMEN-FNA NON-RADIOLOGY CLINICIAN PERFORMED W/O ON SITE: ICD-10-PCS | Mod: 26,TXP,, | Performed by: PATHOLOGY

## 2019-09-16 PROCEDURE — 88341 IMHCHEM/IMCYTCHM EA ADD ANTB: CPT | Mod: 26,TXP,, | Performed by: PATHOLOGY

## 2019-09-16 PROCEDURE — 27202131 HC NEEDLE, FNB SINGLE (ANY): Mod: TXP | Performed by: INTERNAL MEDICINE

## 2019-09-16 PROCEDURE — D9220A PRA ANESTHESIA: ICD-10-PCS | Mod: TXP,,, | Performed by: ANESTHESIOLOGY

## 2019-09-16 PROCEDURE — 63600175 PHARM REV CODE 636 W HCPCS: Mod: TXP | Performed by: NURSE ANESTHETIST, CERTIFIED REGISTERED

## 2019-09-16 PROCEDURE — 88342 IMHCHEM/IMCYTCHM 1ST ANTB: CPT | Mod: TXP | Performed by: PATHOLOGY

## 2019-09-16 PROCEDURE — 43242 EGD US FINE NEEDLE BX/ASPIR: CPT | Mod: TXP | Performed by: INTERNAL MEDICINE

## 2019-09-16 PROCEDURE — 88305 TISSUE EXAM BY PATHOLOGIST: CPT | Mod: TXP | Performed by: PATHOLOGY

## 2019-09-16 PROCEDURE — 88333 CYTOLOGY SPECIMEN-FNA NON-RADIOLOGY CLINICIAN PERFORMED W/O ON SITE: ICD-10-PCS | Mod: 26,TXP,, | Performed by: PATHOLOGY

## 2019-09-16 PROCEDURE — 88305 TISSUE EXAM BY PATHOLOGIST: CPT | Mod: 26,TXP,, | Performed by: PATHOLOGY

## 2019-09-16 PROCEDURE — 88341 CYTOLOGY SPECIMEN-FNA NON-RADIOLOGY CLINICIAN PERFORMED W/O ON SITE: ICD-10-PCS | Mod: 26,TXP,, | Performed by: PATHOLOGY

## 2019-09-16 PROCEDURE — 43242 PR UPGI ENDOSCOPY,FN NEEDLE BX,GUIDED: ICD-10-PCS | Mod: TXP,,, | Performed by: INTERNAL MEDICINE

## 2019-09-16 PROCEDURE — 88333 PATH CONSLTJ SURG CYTO XM 1: CPT | Mod: 26,TXP,, | Performed by: PATHOLOGY

## 2019-09-16 PROCEDURE — 88342 IMHCHEM/IMCYTCHM 1ST ANTB: CPT | Mod: 26,TXP,, | Performed by: PATHOLOGY

## 2019-09-16 PROCEDURE — 63600175 PHARM REV CODE 636 W HCPCS: Mod: TXP | Performed by: INTERNAL MEDICINE

## 2019-09-16 PROCEDURE — 43242 EGD US FINE NEEDLE BX/ASPIR: CPT | Mod: TXP,,, | Performed by: INTERNAL MEDICINE

## 2019-09-16 PROCEDURE — 37000008 HC ANESTHESIA 1ST 15 MINUTES: Mod: TXP | Performed by: INTERNAL MEDICINE

## 2019-09-16 RX ORDER — SODIUM CHLORIDE 0.9 % (FLUSH) 0.9 %
3 SYRINGE (ML) INJECTION
Status: DISCONTINUED | OUTPATIENT
Start: 2019-09-16 | End: 2019-09-16 | Stop reason: HOSPADM

## 2019-09-16 RX ORDER — ONDANSETRON 2 MG/ML
INJECTION INTRAMUSCULAR; INTRAVENOUS
Status: DISCONTINUED | OUTPATIENT
Start: 2019-09-16 | End: 2019-09-16

## 2019-09-16 RX ORDER — SODIUM CHLORIDE 9 MG/ML
INJECTION, SOLUTION INTRAVENOUS CONTINUOUS
Status: DISCONTINUED | OUTPATIENT
Start: 2019-09-16 | End: 2019-09-16 | Stop reason: HOSPADM

## 2019-09-16 RX ORDER — LIDOCAINE HCL/PF 100 MG/5ML
SYRINGE (ML) INTRAVENOUS
Status: DISCONTINUED | OUTPATIENT
Start: 2019-09-16 | End: 2019-09-16

## 2019-09-16 RX ORDER — PROPOFOL 10 MG/ML
VIAL (ML) INTRAVENOUS
Status: DISCONTINUED | OUTPATIENT
Start: 2019-09-16 | End: 2019-09-16

## 2019-09-16 RX ADMIN — PROPOFOL 50 MG: 10 INJECTION, EMULSION INTRAVENOUS at 09:09

## 2019-09-16 RX ADMIN — PROPOFOL 100 MG: 10 INJECTION, EMULSION INTRAVENOUS at 09:09

## 2019-09-16 RX ADMIN — LIDOCAINE HYDROCHLORIDE 100 MG: 20 INJECTION, SOLUTION INTRAVENOUS at 09:09

## 2019-09-16 RX ADMIN — ONDANSETRON 4 MG: 2 INJECTION INTRAMUSCULAR; INTRAVENOUS at 09:09

## 2019-09-16 RX ADMIN — PROPOFOL 60 MG: 10 INJECTION, EMULSION INTRAVENOUS at 09:09

## 2019-09-16 RX ADMIN — SODIUM CHLORIDE: 0.9 INJECTION, SOLUTION INTRAVENOUS at 09:09

## 2019-09-16 NOTE — ANESTHESIA POSTPROCEDURE EVALUATION
Anesthesia Post Evaluation    Patient: Mar Jin    Procedure(s) Performed: Procedure(s) (LRB):  ULTRASOUND, UPPER GI TRACT, ENDOSCOPIC (N/A)    Final Anesthesia Type: general  Patient location during evaluation: PACU  Patient participation: Yes- Able to Participate  Level of consciousness: awake and alert and oriented  Post-procedure vital signs: reviewed and stable  Pain management: adequate  Airway patency: patent  PONV status at discharge: No PONV  Anesthetic complications: no      Cardiovascular status: hemodynamically stable  Respiratory status: unassisted and spontaneous ventilation  Hydration status: euvolemic  Follow-up not needed.          Vitals Value Taken Time   /73 9/16/2019 10:31 AM   Temp 36.6 °C (97.9 °F) 9/16/2019 10:30 AM   Pulse 76 9/16/2019 10:33 AM   Resp 52 9/16/2019 10:33 AM   SpO2 100 % 9/16/2019 10:33 AM   Vitals shown include unvalidated device data.      No case tracking events are documented in the log.      Pain/Bernarda Score: Bernarda Score: 10 (9/16/2019 10:41 AM)

## 2019-09-16 NOTE — INTERVAL H&P NOTE
The patient has been examined and the H&P has been reviewed:    I concur with the findings and no changes have occurred since H&P was written.    Anesthesia/Surgery risks, benefits and alternative options discussed and understood by patient/family.          Active Hospital Problems    Diagnosis  POA    Mediastinal mass [J98.59]  Yes      Resolved Hospital Problems   No resolved problems to display.

## 2019-09-16 NOTE — TRANSFER OF CARE
"Anesthesia Transfer of Care Note    Patient: Mar Jin    Procedure(s) Performed: Procedure(s) (LRB):  ULTRASOUND, UPPER GI TRACT, ENDOSCOPIC (N/A)    Patient location: [facility]    Anesthesia Type: general    Transport from OR: Transported from OR on room air with adequate spontaneous ventilation    Post pain: adequate analgesia    Post assessment: no apparent anesthetic complications and tolerated procedure well    Post vital signs: stable    Level of consciousness: awake and alert    Nausea/Vomiting: no nausea/vomiting    Complications: none    Transfer of care protocol was followed      Last vitals:   Visit Vitals  /73 (BP Location: Left arm, Patient Position: Lying)   Pulse 78   Temp 36.6 °C (97.9 °F) (Skin)   Resp 20   Ht 5' 4" (1.626 m)   Wt 64.4 kg (142 lb)   SpO2 100%   Breastfeeding? No   BMI 24.37 kg/m²     "

## 2019-09-16 NOTE — [employer] PATIENT INSTRUCTIONS
Discharge Summary/Instructions after an Endoscopic Procedure  Patient Name: Mar Jin  Patient MRN: [id number]  Patient YOB: 1955 Monday, September 16, 2019  Steven Nam MD  RESTRICTIONS:  During your procedure today, you received medications for sedation.  These   medications may affect your judgment, balance and coordination.  Therefore,   for 24 hours, you have the following restrictions:   - DO NOT drive a car, operate machinery, make legal/financial decisions,   sign important papers or drink alcohol.    ACTIVITY:  Today: no heavy lifting, straining or running due to procedural   sedation/anesthesia.  The following day: return to full activity including work.  DIET:  Eat and drink normally unless instructed otherwise.     TREATMENT FOR COMMON SIDE EFFECTS:  - Mild abdominal pain, nausea, belching, bloating or excessive gas:  rest,   eat lightly and use a heating pad.  - Sore Throat: treat with throat lozenges and/or gargle with warm salt   water.  - Because air was used during the procedure, expelling large amounts of air   from your rectum or belching is normal.  - If a bowel prep was taken, you may not have a bowel movement for 1-3 days.    This is normal.  SYMPTOMS TO WATCH FOR AND REPORT TO YOUR PHYSICIAN:  1. Abdominal pain or bloating, other than gas cramps.  2. Chest pain.  3. Back pain.  4. Signs of infection such as: chills or fever occurring within 24 hours   after the procedure.  5. Rectal bleeding, which would show as bright red, maroon, or black stools.   (A tablespoon of blood from the rectum is not serious, especially if   hemorrhoids are present.)  6. Vomiting.  7. Weakness or dizziness.  GO DIRECTLY TO THE NEAREST EMERGENCY ROOM IF YOU HAVE ANY OF THE FOLLOWING:      Difficulty breathing              Chills and/or fever over 101 F   Persistent vomiting and/or vomiting blood   Severe abdominal pain   Severe chest pain   Black, tarry stools   Bleeding- more than one  tablespoon   Any other symptom or condition that you feel may need urgent attention  Your doctor recommends these additional instructions:  If any biopsies were taken, your doctors clinic will contact you in 1 to 2   weeks with any results.  - Discharge patient to home (ambulatory).   - Resume previous diet; Discharge to home (ambulatory); Resume outpatient   medications  - Return to liver clinic as previously scheduled.  For questions, problems or results please call your physician - Steven Nam MD at Work:  [phone].  [facility], EMERGENCY ROOM PHONE NUMBER: [phone]  IF A COMPLICATION OR EMERGENCY SITUATION ARISES AND YOU ARE UNABLE TO REACH   YOUR PHYSICIAN - GO DIRECTLY TO THE EMERGENCY ROOM.  Steven Nam MD  9/16/2019 10:20:10 AM  This report has been verified and signed electronically.  [employer]

## 2019-09-16 NOTE — DISCHARGE INSTRUCTIONS
Upper GI Endoscopy     During endoscopy, a long, flexible tube is used to view the inside of your upper GI tract.      Upper GI endoscopy allows your healthcare provider to look directly into the beginning of your gastrointestinal (GI) tract. The esophagus, stomach, and duodenum (the first part of the small intestine) make up the upper GI tract.   Before the exam  Follow these and any other instructions you are given before your endoscopy. If you dont follow the healthcare providers instructions carefully, the test may need to be canceled or done over:  · Don't eat or drink anything after midnight the night before your exam. If your exam is in the afternoon, drink only clear liquids in the morning. Don't eat or drink anything for 8 hours before the exam. In some cases, you may be able to take medicines with sips of water until 2 hours before the procedure. Speak with your healthcare provider about this.   · Bring your X-rays and any other test results you have.  · Because you will be sedated, arrange for an adult to drive you home after the exam.  · Tell your healthcare provider before the exam if you are taking any medicines or have any medical problems.  The procedure  Here is what to expect:  · You will lie on the endoscopy table. Usually patients lie on the left side.  · You will be monitored and given oxygen.  · Your throat may be numbed with a spray or gargle. You are given medicine through an intravenous (IV) line that will help you relax and remain comfortable. You may be awake or asleep during the procedure.  · The healthcare provider will put the endoscope in your mouth and down your esophagus. It is thinner than most pieces of food that you swallow. It will not affect your breathing. The medicine helps keep you from gagging.  · Air is put into your GI tract to expand it. It can make you burp.  · During the procedure, the healthcare provider can take biopsies (tissue samples), remove abnormalities,  such as polyps, or treat abnormalities through a variety of devices placed through the endoscope. You will not feel this.   · The endoscope carries images of your upper GI tract to a video screen. If you are awake, you may be able to look at the images.  · After the procedure is done, you will rest for a time. An adult must drive you home.  When to call your healthcare provider  Contact your healthcare provider if you have:  · Black or tarry stools, or blood in your stool  · Fever  · Pain in your belly that does not go away  · Nausea and vomiting, or vomiting blood   Date Last Reviewed: 7/1/2016 © 2000-2017 [employer]. [address] [place] [address], [place] [address]. All rights reserved. This information is not intended as a substitute for professional medical care. Always follow your healthcare professional's instructions.        Anesthesia: General Anesthesia     You are watched continuously during your procedure by your anesthesia provider.     Youre due to have surgery. During surgery, youll be given medicine called anesthesia or anesthetic. This will keep you comfortable and pain-free. Your anesthesia provider will use general anesthesia.  What is general anesthesia?  General anesthesia puts you into a state like deep sleep. It goes into the bloodstream (IV anesthetics), into the lungs (gas anesthetics), or both. You feel nothing during the procedure. You will not remember it. During the procedure, the anesthesia provider monitors you continuously. He or she checks your heart rate and rhythm, blood pressure, breathing, and blood oxygen.  · IV anesthetics. IV anesthetics are given through an IV line in your arm. Theyre often given first. This is so you are asleep before a gas anesthetic is started. Some kinds of IV anesthetics relieve pain. Others relax you. Your doctor will decide which kind is best in your case.  · Gas anesthetics. Gas anesthetics are breathed into the lungs. They are often used  to keep you asleep. They can be given through a facemask or a tube placed in your larynx or trachea (breathing tube).  ¨ If you have a facemask, your anesthesia provider will most likely place it over your nose and mouth while youre still awake. Youll breathe oxygen through the mask as your IV anesthetic is started. Gas anesthetic may be added through the mask.  ¨ If you have a tube in the larynx or trachea, it will be inserted into your throat after youre asleep.  Anesthesia tools and medicines  You will likely have:  · IV anesthetics. These are put into an IV line into your bloodstream.  · Gas anesthetics. You breathe these anesthetics into your lungs, where they pass into your bloodstream.  · Pulse oximeter. This is a small clip that is attached to the end of your finger. This measures your blood oxygen level.  · Electrocardiography leads (electrodes). These are small sticky pads that are placed on your chest. They record your heart rate and rhythm.  · Blood pressure cuff. This reads your blood pressure.  Risks and possible complications  General anesthesia has some risks. These include:  · Breathing problems  · Nausea and vomiting  · Sore throat or hoarseness (usually temporary)  · Allergic reaction to the anesthetic  · Irregular heartbeat (rare)  · Cardiac arrest (rare)   Anesthesia safety  · Follow all instructions you are given for how long not to eat or drink before your procedure.  · Be sure your doctor knows what medicines and drugs you take. This includes over-the-counter medicines, herbs, supplements, alcohol or other drugs. You will be asked when those were last taken.  · Have an adult family member or friend drive you home after the procedure.  · For the first 24 hours after your surgery:  ¨ Do not drive or use heavy equipment.  ¨ Do not make important decisions or sign legal documents. If important decisions or signing legal documents is necessary during the first 24 hours after surgery, have a  trusted family member or spouse act on your behalf.  ¨ Avoid alcohol.  ¨ Have a responsible adult stay with you. He or she can watch for problems and help keep you safe.  Date Last Reviewed: 12/1/2016  © 2553-9826 [employer] [address] [place] [address], [place] [address]. All rights reserved. This information is not intended as a substitute for professional medical care. Always follow your healthcare professional's instructions.

## 2019-09-16 NOTE — ANESTHESIA PREPROCEDURE EVALUATION
09/16/2019  Mar Jin is a 63 y.o., female   Past Medical History:   Diagnosis Date    Aortic atherosclerosis 7/21/2019    Cirrhosis 7/19/2019    Dermatitis, eczematoid 7/19/2019    Hepatitis C     Hepatitis C virus infection 7/19/2019    Hepatoma 7/29/2019    History of acute pancreatitis 7/21/2019    2018    HTN (hypertension) 7/19/2019    Hyperlipidemia 7/19/2019    Hypertension     Mediastinal mass     Neuropathy     Other ascites 7/19/2019    Polyneuropathy 7/19/2019    Right lower lobe lung mass     Vitamin D deficiency 7/19/2019     Patient Active Problem List   Diagnosis    Sixth cranial nerve palsy, left    Hypertensive retinopathy of both eyes, grade 1    Hepatitis C virus infection    Other ascites    Elevated AFP    Cirrhosis    HTN (hypertension)    Hyperlipidemia    Vitamin D deficiency    Dermatitis, eczematoid    Polyneuropathy    History of acute pancreatitis    Aortic atherosclerosis    Hepatoma    Right lower lobe lung mass    Other emphysema    Mediastinal mass     Past Surgical History:   Procedure Laterality Date    ENDOBRONCHIAL ULTRASOUND (EBUS) N/A 8/27/2019    Performed [facility] at [facility] 2ND FLR    PARACENTESIS, ABDOMINAL N/A 8/7/2019    Performed by Jonathan Ding MD at [facility] CATH LAB     Review of patient's allergies indicates:   Allergen Reactions    Codeine Nausea Only       Pre-op Assessment    I have reviewed the Patient Summary Reports.     I have reviewed the Nursing Notes.      Review of Systems  Anesthesia Hx:  No previous Anesthesia  Neg history of prior surgery. Denies Family Hx of Anesthesia complications.    Social:  Smoker    Hematology/Oncology:  Hematology Normal        EENT/Dental:EENT/Dental Normal   Cardiovascular:   Hypertension    Pulmonary:   COPD    Hepatic/GI:   Liver Disease, Hepatitis        Physical  Exam  General:  Well nourished    Airway/Jaw/Neck:  Airway Findings: Mouth Opening: Normal Tongue: Normal  General Airway Assessment: Adult  Mallampati: I        Eyes/Ears/Nose:  EYES/EARS/NOSE FINDINGS: Normal   Dental:  Dental Findings: Edentulous   Chest/Lungs:  Chest/Lungs Clear    Heart/Vascular:  Heart Findings: Normal Heart murmur: negative       Mental Status:  Mental Status Findings: Normal        Anesthesia Plan  Type of Anesthesia, risks & benefits discussed:  Anesthesia Type:  general  Patient's Preference:   Intra-op Monitoring Plan: standard ASA monitors  Intra-op Monitoring Plan Comments:   Post Op Pain Control Plan: multimodal analgesia and per primary service following discharge from PACU  Post Op Pain Control Plan Comments:   Induction:   IV  Beta Blocker:  Patient is not currently on a Beta-Blocker (No further documentation required).       Informed Consent: Patient understands risks and agrees with Anesthesia plan.  Questions answered. Anesthesia consent signed with patient.  ASA Score: 3     Day of Surgery Review of History & Physical: I have interviewed and examined the patient. I have reviewed the patient's H&P dated:  There are no significant changes.          Ready For Surgery From Anesthesia Perspective.

## 2019-09-19 ENCOUNTER — TELEPHONE (OUTPATIENT)
Dept: PULMONOLOGY | Facility: CLINIC | Age: 64
End: 2019-09-19

## 2019-09-19 DIAGNOSIS — C34.91 SQUAMOUS CELL LUNG CANCER, RIGHT: Primary | ICD-10-CM

## 2019-09-19 NOTE — TELEPHONE ENCOUNTER
Mediastinal mass positive for squamous cell lung cancer.  Patient with hepatocellular carcinoma.  Long phone discussion with patient that she would not want to pursue treatments but would like her to meet with Liver transplant and oncology.  Patient would like me to call her sister, Nora Smith and discuss options.  I think that this is a brave.     We had a very candid conversation that Mrs. Jin initiated regarding not wanting to pursue treatment.  She prefers quality over quantity but a urged her to meet with Dr. Hay in oncology to pursue options.

## 2019-09-20 ENCOUNTER — TELEPHONE (OUTPATIENT)
Dept: HEMATOLOGY/ONCOLOGY | Facility: CLINIC | Age: 64
End: 2019-09-20

## 2019-09-20 NOTE — NURSING
Oncology Navigation   Intake  Date of Diagnosis: 09/19/19  Cancer Type: Thoracic;GI  MD Assigned: Dr. Hay  Internal / External Referral: Internal  Initial Nurse Navigator Contact: 09/20/19  Diagnosis to Initial Contact Timeline (days): 1 days  Contact Method: Queue  Date Worked: 09/20/19  First Appointment Available: 09/25/19  Appointment Date: 09/25/19  Schedule to Appointment Timeline (days): 5  First Available Date vs. Scheduled Date (days): 0  Multiple appointments: No     Treatment               Acuity      Follow Up  Follow up in about 6 days (around 9/26/2019) for To follow up on initial appointment.   Received referral from pt from Dr. Merino to see Oncology to discuss new diagnosis of SCLC .  Spoke with pt who is agreeable with seeing Dr. Satnam truong 9/25.  Verbalized understanding of where to go for appointment.

## 2019-09-23 ENCOUNTER — TELEPHONE (OUTPATIENT)
Dept: TRANSPLANT | Facility: CLINIC | Age: 64
End: 2019-09-23

## 2019-09-23 NOTE — TELEPHONE ENCOUNTER
Pt missed appt today. Call to pt-message left. Will have MA reschedule. Call to Elliott- relative listed as contact. Message left.

## 2019-09-24 DIAGNOSIS — Z76.82 ORGAN TRANSPLANT CANDIDATE: Primary | ICD-10-CM

## 2019-09-25 ENCOUNTER — TELEPHONE (OUTPATIENT)
Dept: HEMATOLOGY/ONCOLOGY | Facility: CLINIC | Age: 64
End: 2019-09-25

## 2019-09-25 ENCOUNTER — INITIAL CONSULT (OUTPATIENT)
Dept: HEMATOLOGY/ONCOLOGY | Facility: CLINIC | Age: 64
End: 2019-09-25
Payer: MEDICAID

## 2019-09-25 VITALS
SYSTOLIC BLOOD PRESSURE: 131 MMHG | DIASTOLIC BLOOD PRESSURE: 84 MMHG | RESPIRATION RATE: 20 BRPM | OXYGEN SATURATION: 98 % | TEMPERATURE: 99 F | HEIGHT: 64 IN | WEIGHT: 138.69 LBS | HEART RATE: 106 BPM | BODY MASS INDEX: 23.68 KG/M2

## 2019-09-25 DIAGNOSIS — C34.31 MALIGNANT NEOPLASM OF LOWER LOBE OF RIGHT LUNG: ICD-10-CM

## 2019-09-25 DIAGNOSIS — C22.0 HEPATOMA: Primary | ICD-10-CM

## 2019-09-25 PROCEDURE — 99205 PR OFFICE/OUTPT VISIT, NEW, LEVL V, 60-74 MIN: ICD-10-PCS | Mod: S$PBB,,, | Performed by: INTERNAL MEDICINE

## 2019-09-25 PROCEDURE — 99999 PR PBB SHADOW E&M-EST. PATIENT-LVL IV: CPT | Mod: PBBFAC,,, | Performed by: INTERNAL MEDICINE

## 2019-09-25 PROCEDURE — 99999 PR PBB SHADOW E&M-EST. PATIENT-LVL IV: ICD-10-PCS | Mod: PBBFAC,,, | Performed by: INTERNAL MEDICINE

## 2019-09-25 PROCEDURE — 99214 OFFICE O/P EST MOD 30 MIN: CPT | Mod: PBBFAC | Performed by: INTERNAL MEDICINE

## 2019-09-25 PROCEDURE — 99205 OFFICE O/P NEW HI 60 MIN: CPT | Mod: S$PBB,,, | Performed by: INTERNAL MEDICINE

## 2019-09-25 NOTE — Clinical Note
MRI abdomen next available. She is switching to MEDICARE on 10/1/19. So schedule PET early AM on 10/2/19 and add to Thoracic conference on 10/2/19 and see me after that on 10/3 or 10/4

## 2019-09-25 NOTE — PROGRESS NOTES
DATE OF VISIT:  2019    REASON FOR VISIT:  New diagnosis of lung cancer and also has finding suspicious   for hepatocellular cancer.    HISTORY OF PRESENT ILLNESS:  Ms. Mar Jin is a 63-year-old female with   hepatitis C, being followed by Transplant, underwent scans in 2019 and   was noted to have a 4-cm liver lesion that was indeterminate.  She also had a   2.2 cm spiculated lung lesion.  She was referred to see Dr. Merino, underwent   bronchoscopy on 2019 and lymph node stations were negative.  She was then   referred to undergo a EUS, which she did on 2019 and mediastinal lymph   node biopsy revealed squamous cell carcinoma.  She was recommended by Dr. Porter   to undergo MRI abdomen, which she has not done yet.  She comes in to discuss   management options.  She notes fatigue and abdominal distention, but denies any   other complaints at this time.  She is accompanied to the clinic today with her   daughter.    REVIEW OF SYSTEMS:  CONSTITUTIONAL:  Negative for appetite change, reports fatigue.  RESPIRATORY:  Negative for cough and shortness of breath.  CARDIOVASCULAR:  Negative for chest pain.  GASTROINTESTINAL:  Negative for diarrhea.  Reports some abdominal distention.  GENITOURINARY:  Negative for frequency.  MUSCULOSKELETAL:  Negative for back pain.  SKIN:  Negative for rash.  NEUROLOGIC:  Negative for headaches.  HEMATOLOGIC:  Negative for adenopathy.  PSYCHIATRIC AND BEHAVIORAL:  Not nervous or anxious.    COMORBID MEDICAL CONDITIONS:  Include hepatitis C, aortic atherosclerosis,   cirrhosis, hypertension, hyperlipidemia, polyneuropathy.    PAST SURGICAL HISTORY:  Peritoneocentesis.    FAMILY HISTORY:  Brother  with HIV.  Father  with heart disease.    Mother  of lung disease.  Sister alive with hypertension.    SOCIAL HISTORY:  Current every day smoker, smokes about half pack a day.  Denies   any alcohol use, quit drinking seven years ago.  She used to  use cocaine,   marijuana and heroin when she was younger, not anymore.    PHYSICAL EXAMINATION:  GENERAL:  The patient is oriented to person, place and time, appears moderately   built, moderately nourished, in no acute distress.  HEENT:  Right and left ears are normal.  Oropharynx is clear and moist, no   oropharyngeal exudate.  Teeth, gums and lips are normal.  No sinus tenderness.    Palate, tongue, posterior pharynx are normal.  EYES:  Conjunctivae and lids are normal.  NECK:  Supple.  No JVD.  No thyromegaly.  CARDIOVASCULAR:  Regular rate and rhythm.  No murmurs heard.  No edema or   tenderness in the extremities.  PULMONARY AND CHEST:  Bilateral breath sounds heard, no rales, rhonchi or   wheezes.  ABDOMEN:  Soft, nontender and nondistended.  No hepatomegaly or splenomegaly.  MUSCULOSKELETAL:  Normal range of motion.  EXTREMITIES:  Gait is normal.  No clubbing or cyanosis.  LYMPHADENOPATHY:  No submental, submandibular, cervical, supraclavicular lymph   nodes noted.  NEUROLOGIC:  Alert and oriented to person, place and time.  SKIN:  Warm, dry and intact.  No bruising, no lesions, no rashes.  PSYCHIATRIC:  Normal mood and affect.  Speech, behavior, judgment, thought   content, cognition and memory are normal.    LABORATORY DATA:  From 09/16/2019 reveals a CMP with sodium of 135, total   protein is 8.8, albumin 3.4, alkaline phosphatase 178, AST 64.    Alpha-fetoprotein is 35.  CBC, which is within normal limits.  PT is 1.2.    IMAGING:  As discussed above.    ASSESSMENT AND PLAN:  Ms. Mar Jin has a lesion in the liver, which   requires MRI abdomen to assess if it is hepatocellular carcinoma.  This will be   scheduled for her.  She is also switching to Medicare starting 10/01/2019.  We   will plan on getting a PET scan.  Her case was reviewed at thoracic conference   on 09/04/2019 and the consensus at that time was to repeat a biopsy and then   decide on lobectomy.  So we will go ahead and represented  the case at the   conference once the MRI abdomen results are available.  Hopefully, we will have   the PET scan result by the time we present.  The rationale for this was   discussed with the patient and she is agreeable with this plan.  I will see her   back once the above has been completed.  Distress score is 8 and no intervention   is indicated.        SPS/HN  dd: 09/25/2019 14:29:01 (CDT)  td: 09/26/2019 08:09:55 (CDT)  Doc ID   #[id number]  Job ID #[id number]    CC:       Distress Score    Distress Score: 8        Practical Problems Physical Problems   : No Appearance: No   Housing: No Bathing / Dressing: No   Insurance / Financial: No Breathing: No    Transportation: No  Changes in Urination: No    Work / School: No  Constipation: No   Treatment Decisions: No  Diarrhea: No     Eating: No    Family Problems Fatigue: No    Dealing with Children: No Feeling Swollen: No    Dealing with Partner: No Fevers: No    Ability to Have Children: No  Getting Around: No    Family Health Issues: No  Indigestion: No     Memory / Concentration: No   Emotional Problems Mouth Sores: No    Depression: No  Nausea: No    Fears: No  Nose Dry / Congested: No    Nervousness: No  Pain: Yes    Sadness: No Sexual: No    Worry: No Skin Dry / Itchy: No    Loss of Interest in Usual Activities: No Sleep: No     Substance Abuse: No    Spiritual/Religions Concerns Tingling in Hands / Feet: No   Spritual / Zoroastrianism Concerns: No         Other Problems            Dictated # [id number]

## 2019-09-25 NOTE — TELEPHONE ENCOUNTER
----- Message from Janeth Swanson sent at 9/25/2019 12:57 PM CDT -----  Contact: pt  Pt called to speak with nurse jerrell about appt running a little late a train is on the track but pt is coming   Callback#[phone]

## 2019-09-26 ENCOUNTER — TELEPHONE (OUTPATIENT)
Dept: HEMATOLOGY/ONCOLOGY | Facility: CLINIC | Age: 64
End: 2019-09-26

## 2019-10-02 ENCOUNTER — HOSPITAL ENCOUNTER (OUTPATIENT)
Dept: RADIOLOGY | Facility: HOSPITAL | Age: 64
Discharge: HOME | End: 2019-10-02
Attending: INTERNAL MEDICINE
Payer: MEDICARE

## 2019-10-02 ENCOUNTER — DOCUMENTATION ONLY (OUTPATIENT)
Dept: CARDIOTHORACIC SURGERY | Facility: CLINIC | Age: 64
End: 2019-10-02

## 2019-10-02 DIAGNOSIS — C34.31 MALIGNANT NEOPLASM OF LOWER LOBE OF RIGHT LUNG: ICD-10-CM

## 2019-10-02 DIAGNOSIS — C22.0 HEPATOMA: ICD-10-CM

## 2019-10-02 LAB — POCT GLUCOSE: 79 MG/DL (ref 70–110)

## 2019-10-02 PROCEDURE — 78815 PET IMAGE W/CT SKULL-THIGH: CPT | Mod: 26,PS,NTX, | Performed by: RADIOLOGY

## 2019-10-02 PROCEDURE — 74183 MRI ABD W/O CNTR FLWD CNTR: CPT | Mod: 26,NTX,, | Performed by: RADIOLOGY

## 2019-10-02 PROCEDURE — A9552 F18 FDG: HCPCS | Mod: TXP

## 2019-10-02 PROCEDURE — 78815 NM PET CT ROUTINE: ICD-10-PCS | Mod: 26,PS,NTX, | Performed by: RADIOLOGY

## 2019-10-02 PROCEDURE — 74183 MRI ABDOMEN W WO CONTRAST: ICD-10-PCS | Mod: 26,NTX,, | Performed by: RADIOLOGY

## 2019-10-02 PROCEDURE — 25500020 PHARM REV CODE 255: Mod: NTX | Performed by: INTERNAL MEDICINE

## 2019-10-02 PROCEDURE — 74183 MRI ABD W/O CNTR FLWD CNTR: CPT | Mod: TC,NTX

## 2019-10-02 PROCEDURE — 78815 PET IMAGE W/CT SKULL-THIGH: CPT | Mod: TC,TXP

## 2019-10-02 PROCEDURE — A9585 GADOBUTROL INJECTION: HCPCS | Mod: NTX | Performed by: INTERNAL MEDICINE

## 2019-10-02 RX ORDER — GADOBUTROL 604.72 MG/ML
10 INJECTION INTRAVENOUS
Status: COMPLETED | OUTPATIENT
Start: 2019-10-02 | End: 2019-10-02

## 2019-10-02 RX ADMIN — GADOBUTROL 10 ML: 604.72 INJECTION INTRAVENOUS at 09:10

## 2019-10-02 NOTE — PATIENT CARE CONFERENCE
[facility]      THORACIC MULTIDISCIPLINARY TUMOR BOARD  PATIENT REVIEW FORM  ________________________________________________________________________    CLINIC #: [id number]  DATE: 10/02/2019    DIAGNOSIS:   NSCLC    PRESENTER:   Dr. Hay     PATIENT SUMMARY:   62 yo female former smoker with Hep C/cirrhosis who in Aug 2019 was found to have a 4cm liver lesion and 2.2 cm spiculated lung lesion. Liver lesion indeterminate but concerning for HCC with elevated AFP. Right lower lobe lung mass abuts the mediastinum posterior to the BI. Bronchoscopy and EBUS on 8/27/19 which was negative thus she was referred for EUS which was positive for squamous cell carcinoma. MRI abdomen and PET today.     MELD 8   No PFTs     BOARD RECOMMENDATIONS:   Biopsy proven squamous cell lung cancer of the RLL versus level 7 LN without evidence of distant disease. Liver lesion consistent with HCC based on abdominal MRI. She is not a surgical candidate for lung resection with bulky level 7 disease and underlying cirrhosis. Recommend chemoRT for treatment of NSCLC prior to HCC treatment.     CONSULT NEEDED:     [] Surgery    [x] Hem/Onc    [x] Rad/Onc    [] Dietary                 [] Social Service    [] Psychology       [] Pulmonology     Clinical Stage: Tumor 1c Node(s) 2 Metastasis 0  Pathologic Stage: Tumor  Node(s)  Metastasis     GROUP STAGE:     [] O    [] 1A    [] IB    [] IIA    [] IIB     [x] IIIA     [] IIIB     [] IIIC    [] IV                               [] Local recurrence     [] Regional recurrence     [] Distant recurrence                   [] NSCLC     [] SCLC     Tumor type squamous     Unstageable:      [] Yes     [] No  Metastatic site(s): no         [x] Ag[facility]iesz[facility] Treatment Guidelines reviewed and care planned is consistent with guidelines.         (i.e., NCCN, NCI, PD, ACO, AUA, etc.)    PRESENTATION AT CANCER CONFERENCE:         [] Prospective    [] Retrospective     [] Follow-Up          [] Eligible for clinical  trial

## 2019-10-03 ENCOUNTER — TELEPHONE (OUTPATIENT)
Dept: TRANSPLANT | Facility: CLINIC | Age: 64
End: 2019-10-03

## 2019-10-09 ENCOUNTER — TELEPHONE (OUTPATIENT)
Dept: HEMATOLOGY/ONCOLOGY | Facility: CLINIC | Age: 64
End: 2019-10-09

## 2019-10-09 ENCOUNTER — OFFICE VISIT (OUTPATIENT)
Dept: HEMATOLOGY/ONCOLOGY | Facility: CLINIC | Age: 64
End: 2019-10-09
Payer: MEDICARE

## 2019-10-09 ENCOUNTER — TELEPHONE (OUTPATIENT)
Dept: RADIATION ONCOLOGY | Facility: CLINIC | Age: 64
End: 2019-10-09

## 2019-10-09 VITALS
DIASTOLIC BLOOD PRESSURE: 91 MMHG | HEART RATE: 95 BPM | RESPIRATION RATE: 20 BRPM | TEMPERATURE: 98 F | SYSTOLIC BLOOD PRESSURE: 135 MMHG | HEIGHT: 64 IN | WEIGHT: 138 LBS | BODY MASS INDEX: 23.56 KG/M2 | OXYGEN SATURATION: 97 %

## 2019-10-09 DIAGNOSIS — C22.0 CANCER, HEPATOCELLULAR: ICD-10-CM

## 2019-10-09 DIAGNOSIS — C34.31 MALIGNANT NEOPLASM OF LOWER LOBE OF RIGHT LUNG: Primary | ICD-10-CM

## 2019-10-09 PROCEDURE — 99215 OFFICE O/P EST HI 40 MIN: CPT | Mod: S$PBB,,, | Performed by: INTERNAL MEDICINE

## 2019-10-09 PROCEDURE — 99999 PR PBB SHADOW E&M-EST. PATIENT-LVL III: ICD-10-PCS | Mod: PBBFAC,,, | Performed by: INTERNAL MEDICINE

## 2019-10-09 PROCEDURE — 99213 OFFICE O/P EST LOW 20 MIN: CPT | Mod: PBBFAC | Performed by: INTERNAL MEDICINE

## 2019-10-09 PROCEDURE — 99215 PR OFFICE/OUTPT VISIT, EST, LEVL V, 40-54 MIN: ICD-10-PCS | Mod: S$PBB,,, | Performed by: INTERNAL MEDICINE

## 2019-10-09 PROCEDURE — 99999 PR PBB SHADOW E&M-EST. PATIENT-LVL III: CPT | Mod: PBBFAC,,, | Performed by: INTERNAL MEDICINE

## 2019-10-09 NOTE — TELEPHONE ENCOUNTER
----- Message from Karyna Potts sent at 10/9/2019  2:09 PM CDT -----  Contact: Pt  Pt missed a call and would like the nurse to return their call.    Pt can be reached at [phone]

## 2019-10-09 NOTE — PROGRESS NOTES
"Subjective:       Patient ID: Mar Jin is a 63 y.o. female.    Chief Complaint: Hepatoma  Ms. Mar Jin is a 63-year-old female with hepatitis C, being followed by Transplant, underwent scans in August 2019 and was noted to have a 4-cm liver lesion that was indeterminate.  She also had a 2.2 cm spiculated lung lesion.  She was referred to see Dr. Merino, underwent bronchoscopy on 08/27/2019 and lymph node stations were negative.  She was then referred to undergo a EUS, which she did on 09/16/2019 and mediastinal lymph node biopsy revealed squamous cell carcinoma.  She was recommended by Dr. Porter to undergo MRI abdomen    HPIShe underwent MRI abdomen on 10/2/19 reveals "Cirrhotic appearance of the liver with a 4.3 cm mass in the right hepatic lobe demonstrating characteristics consistent with hepatocellular carcinoma.  Few additional subcentimeter arterial enhancing lesions that are indeterminate without washout. 3.8 cm mass in the medial right lower lobe abutting the mediastinal mass, better visualized on CT 07/29/2019. Moderate volume ascites"  PET scan on 10/2/19 reveals "Known right lower lobe pulmonary nodule demonstrates increased tracer uptake, consistent with biopsy-proven squamous cell carcinoma.  No other areas of increased uptake suspicious for malignancy are identified"  We discussed his case in Thoracic conference "Biopsy proven squamous cell lung cancer of the RLL versus level 7 LN without evidence of distant disease. Liver lesion consistent with HCC based on abdominal MRI. She is not a surgical candidate for lung resection with bulky level 7 disease and underlying cirrhosis. Recommend chemoRT for treatment of NSCLC prior to HCC treatment"    Review of Systems   Constitutional: Negative for appetite change, fatigue and unexpected weight change.   HENT: Negative for mouth sores.    Eyes: Negative for visual disturbance.   Respiratory: Negative for cough and shortness of breath.  "   Cardiovascular: Negative for chest pain.   Gastrointestinal: Negative for abdominal pain and diarrhea.   Genitourinary: Negative for frequency.   Musculoskeletal: Negative for back pain.   Skin: Negative for rash.   Neurological: Negative for headaches.   Hematological: Negative for adenopathy.   Psychiatric/Behavioral: The patient is not nervous/anxious.    All other systems reviewed and are negative.      Objective:      Physical Exam   Constitutional: She is oriented to person, place, and time. She appears well-developed and well-nourished.   HENT:   Mouth/Throat: No oropharyngeal exudate.   Cardiovascular: Normal rate and normal heart sounds.   Pulmonary/Chest: Effort normal and breath sounds normal. She has no wheezes.   Abdominal: Soft. Bowel sounds are normal. There is no tenderness.   Musculoskeletal: She exhibits no edema or tenderness.   Lymphadenopathy:     She has no cervical adenopathy.   Neurological: She is alert and oriented to person, place, and time. Coordination normal.   Skin: Skin is warm and dry. No rash noted.   Psychiatric: She has a normal mood and affect. Judgment and thought content normal.   Vitals reviewed.        LABS:  WBC   Date Value Ref Range Status   09/16/2019 7.59 3.90 - 12.70 K/uL Final     Hemoglobin   Date Value Ref Range Status   09/16/2019 12.2 12.0 - 16.0 g/dL Final     Hematocrit   Date Value Ref Range Status   09/16/2019 36.0 (L) 37.0 - 48.5 % Final     Platelets   Date Value Ref Range Status   09/16/2019 155 150 - 350 K/uL Final     Gran # (ANC)   Date Value Ref Range Status   09/16/2019 3.4 1.8 - 7.7 K/uL Final     Gran%   Date Value Ref Range Status   09/16/2019 44.7 38.0 - 73.0 % Final       Chemistry        Component Value Date/Time     (L) 09/16/2019 0807    K 3.5 09/16/2019 0807     09/16/2019 0807    CO2 22 (L) 09/16/2019 0807    BUN 7 (L) 09/16/2019 0807    CREATININE 0.8 09/16/2019 0807    GLU 90 09/16/2019 0807        Component Value Date/Time     CALCIUM 9.1 09/16/2019 0807    ALKPHOS 178 (H) 09/16/2019 0807    AST 64 (H) 09/16/2019 0807    ALT 30 09/16/2019 0807    BILITOT 1.0 09/16/2019 0807    ESTGFRAFRICA >60.0 09/16/2019 0807    EGFRNONAA >60.0 09/16/2019 0807          Assessment:       1. Malignant neoplasm of lower lobe of right lung    2. Cancer, hepatocellular        Plan:        1. Stage III lung cancer. We discussed her case in thoracic conference and plan is to proceed with chemo/RT with weekly Carboplatin and Taxol and then consolidation with Imfinzi.  2. She will need liver resection most likely after she completes chemo/RT    Above care plan was discussed with patient and accompanying sister and all questions were addressed to their satisfaction

## 2019-10-09 NOTE — TELEPHONE ENCOUNTER
----- Message from Dawna Hay MD sent at 10/9/2019  9:37 AM CDT -----  Schedule to see Rad/onc consult in [facility]. She also wants to get chemo in [place] so needs to see Med/onc there. Orders are in for chemo at [place], please get auth

## 2019-10-09 NOTE — PLAN OF CARE
START ON PATHWAY REGIMEN - Non-Small Cell Lung    [id number]        Paclitaxel (Taxol(R))       Carboplatin (Paraplatin(R))           Additional Orders: * All AUC calculations intended to be used in [place]   formula    **Always confirm dose/schedule in your pharmacy ordering system**    Patient Characteristics:  Stage III - Unresectable, PS = 0, 1  AJCC T Category: TX  Current Disease Status: No Distant Mets or Local Recurrence  AJCC N Category: NX  AJCC M Category: M0  AJCC 8 Stage Grouping: IIIA  Performance Status: PS = 0, 1  Intent of Therapy:  Curative Intent, Discussed with Patient

## 2019-10-09 NOTE — Clinical Note
Schedule to see Rad/onc consult in [place] ASAP. She also wants to get chemo in [place] so needs to see Med/onc there. Orders are in for chemo at [place], please get auth

## 2019-10-09 NOTE — TELEPHONE ENCOUNTER
Phone call to patient to schedule appt in radiation  Patient understands her consultation will be at [facility] on tues at 9 am  She request her treatment to be at the [facility]

## 2019-10-09 NOTE — TELEPHONE ENCOUNTER
Spoke with patient. She is calling to state she needs 48 hours notice to make an appointment, as she relies on medicaid transportation. She is requesting to speak with the RT nurse again.      Message routed to mihir

## 2019-10-09 NOTE — TELEPHONE ENCOUNTER
----- Message from Janeth Swanson sent at 10/9/2019  2:11 PM CDT -----  Contact: Pt   Pt called to speak with nurse have some questions about her Radiation Oncology and states that it was wrong and pt would like it at  [facility] only   Callback#[phone]  Thank You  STEVEN Swanson

## 2019-10-15 ENCOUNTER — TELEPHONE (OUTPATIENT)
Dept: HEMATOLOGY/ONCOLOGY | Facility: CLINIC | Age: 64
End: 2019-10-15

## 2019-10-15 ENCOUNTER — INITIAL CONSULT (OUTPATIENT)
Dept: RADIATION ONCOLOGY | Facility: CLINIC | Age: 64
End: 2019-10-15
Payer: MEDICARE

## 2019-10-15 ENCOUNTER — DOCUMENTATION ONLY (OUTPATIENT)
Dept: RADIATION ONCOLOGY | Facility: CLINIC | Age: 64
End: 2019-10-15

## 2019-10-15 VITALS
WEIGHT: 140.38 LBS | SYSTOLIC BLOOD PRESSURE: 124 MMHG | HEART RATE: 92 BPM | OXYGEN SATURATION: 98 % | TEMPERATURE: 98 F | HEIGHT: 64 IN | RESPIRATION RATE: 18 BRPM | BODY MASS INDEX: 23.97 KG/M2 | DIASTOLIC BLOOD PRESSURE: 81 MMHG

## 2019-10-15 DIAGNOSIS — C34.31 MALIGNANT NEOPLASM OF LOWER LOBE OF RIGHT LUNG: Primary | ICD-10-CM

## 2019-10-15 PROCEDURE — 99999 PR PBB SHADOW E&M-EST. PATIENT-LVL III: ICD-10-PCS | Mod: PBBFAC,,, | Performed by: RADIOLOGY

## 2019-10-15 PROCEDURE — 99213 OFFICE O/P EST LOW 20 MIN: CPT | Mod: PBBFAC | Performed by: RADIOLOGY

## 2019-10-15 PROCEDURE — 99205 PR OFFICE/OUTPT VISIT, NEW, LEVL V, 60-74 MIN: ICD-10-PCS | Mod: S$PBB,,, | Performed by: RADIOLOGY

## 2019-10-15 PROCEDURE — 99205 OFFICE O/P NEW HI 60 MIN: CPT | Mod: S$PBB,,, | Performed by: RADIOLOGY

## 2019-10-15 PROCEDURE — 99999 PR PBB SHADOW E&M-EST. PATIENT-LVL III: CPT | Mod: PBBFAC,,, | Performed by: RADIOLOGY

## 2019-10-15 RX ORDER — HYDROCHLOROTHIAZIDE 25 MG/1
25 TABLET ORAL DAILY
COMMUNITY

## 2019-10-15 NOTE — TELEPHONE ENCOUNTER
"----- Message from Jody Tyler sent at 10/15/2019  8:54 AM CDT -----  Consult/Advisory:    Name Of Caller: Mar ODOM  Provider Name:  Dawna Hay MD  What is the nature of the call?:  - pt will be late to the appts today, unclear of how long. Please call and advise .   Does patient feel the need to be seen today? NA  Relationship to the Pt?: self   Contact Preference?: [phone]    Additional Notes:  "Thank you for all that you do for our patients'"      "

## 2019-10-15 NOTE — PROGRESS NOTES
"Spoke to the patient about transportation to her radiation treatments on request from Wanda Stein, RN in Radiation Oncology: She stated that she was using Medicaid transportation, but that they did not show up this morning and that her landlord had to bring her to her appointment. She did not need transportation to her radiation treatments since her friend would bring her to the [facility], but she needed transportation to her SIM appointment on 10/22/19 (at [facility]). Will arrange for transportation closer to the date. The patient also requested nutritional supplements - message sent to Dr Hay since she did not report any significant weight loss for this patient in her last progress note dated 10/9/2019 ("Negative for appetite change, fatigue and unexpected weight change"). Provided the patient with contact information for me.   "

## 2019-10-15 NOTE — LETTER
October 15, 2019      Dawna Hay MD  [address]  [facility] [address]           [facility]  [address]  [facility] [address]  Phone: [phone]          Patient: Mar Jin   MR Number: [id number]   YOB: 1955   Date of Visit: 10/15/2019       Dear Dr. Dawna Hay:    Thank you for referring Mar Jin to me for evaluation. Attached you will find relevant portions of my assessment and plan of care.    If you have questions, please do not hesitate to call me. I look forward to following Mar Jin along with you.    Sincerely,    Kristian Salamanca MD    Enclosure  CC:  No Recipients    If you would like to receive this communication electronically, please contact [email or web address] or [phone] to request more information on [employer] access.    For providers and/or their staff who would like to refer a patient to [facility], please contact us through our one-stop-shop provider referral line, [facility], at [phone].    If you feel you have received this communication in error or would no longer like to receive these types of communications, please e-mail [email or web address]

## 2019-10-15 NOTE — PROGRESS NOTES
10/15/2019    Radiation Oncology Consultation    Assessment   This is a 63 y.o. y/o female with localized NSCLC (squam) diagnosed on EUS 9/16/19. By imaging it is unclear whether the 2.3 cm lesion is arising from medial RLL parenchyma (cT1c/T2) or from a lymph node in the mediastinum (cN2 disease), so TNM stage cannot be determined. She also has cirrhosis with HCC that was initially being worked-up for transplant until discovery of separate lung primary. She is referred for consideration of treatment for NSCLC.     Since there is a chance her disease is sharonda with unknown primary, and since it is not in a location amenable to definitive SBRT, I recommend treating as locally advanced disease. I discussed the natural history and treatment options available for locally advanced NSCLC. The PACIFIC trial demonstrated significant improvement in Overall Survival for patients with Stage III disease treated with concurrent chemo-RT followed by immunotherapy (2-yr OS 66% vs 55% placebo). I recommend treating gross disease with margin to 60 Gy in 30 fractions. Potential short- and long-term side effects of radiation therapy were reviewed. At the end of our discussion, she wished to proceed with the recommended treatment.        Plan   1) Schedule CT Simulation for radiation treatment planning. Patient will receive treatment at our [facility] facility then follow-up with [place] at [facility] [place] of [place].        Chief Complaint   Patient presents with    Lung Cancer     consult       HPI: Ms. Jin is a 62 y/o female w/ cirrhosis and known hepatocellular carcinoma who was being considered for liver transplant and underwent CT C/A/P 7/29/19 that demonstrated a 2.2 cm soft tissue density in the medial RLL between the bronchus intermedius and vertebral body. EBUS 8/27/19 by Dr. Merino was negative in 11L node and station 7. She underwent EUS with Dr. Nam 9/16/19 with biopsy of the lesion that revealed moderately differentiated  squamous cell carcinoma. PET/CT 10/2/19 demonstrated uptake in the 2.3 cm RLL lesion only. It is unclear whether this is arising from lung parenchyma or is a lymph node. She is not a surgical candidate due to location and comorbidities. She is referred for consideration of treatment options.     In clinic today, the patient is unaccompanied. She reports weight loss, loss of appetite, chronic cough. She denies hemoptysis or dyspnea on exertion. No difficulty swallowing.       Prior Radiation History: None    Past Medical History:   Diagnosis Date    Aortic atherosclerosis 7/21/2019    Cirrhosis 7/19/2019    Dermatitis, eczematoid 7/19/2019    Hepatitis C     Hepatitis C virus infection 7/19/2019    Hepatoma 7/29/2019    History of acute pancreatitis 7/21/2019    2018    HTN (hypertension) 7/19/2019    Hyperlipidemia 7/19/2019    Hypertension     Mediastinal mass     Neuropathy     Other ascites 7/19/2019    Polyneuropathy 7/19/2019    Right lower lobe lung mass     Vitamin D deficiency 7/19/2019       Past Surgical History:   Procedure Laterality Date    ENDOBRONCHIAL ULTRASOUND N/A 8/27/2019    Procedure: ENDOBRONCHIAL ULTRASOUND (EBUS);  Surgeon: Renee Merino MD;  Location: [facility] OR [address] [place] [address];  Service: Pulmonary;  Laterality: N/A;    ENDOSCOPIC ULTRASOUND OF UPPER GASTROINTESTINAL TRACT N/A 9/16/2019    Procedure: ULTRASOUND, UPPER GI TRACT, ENDOSCOPIC;  Surgeon: Steven Nam MD;  Location: [facility] ([address] [place] [address]);  Service: Endoscopy;  Laterality: N/A;  CBC, PT/INR scheduled before procedure (liver cirrhosis).    PERITONEOCENTESIS N/A 8/7/2019    Procedure: PARACENTESIS, ABDOMINAL;  Surgeon: Jonathan Ding MD;  Location: [facility] CATH LAB;  Service: Radiology;  Laterality: N/A;       Social History     Tobacco Use    Smoking status: Current Every Day Smoker     Packs/day: 0.50     Types: Cigarettes    Smokeless tobacco: Never Used    Tobacco comment: currently smokes 4 cigarette per day    Substance Use Topics    Alcohol use: Not Currently     Comment: quit drinking 7 years ago    Drug use: Not Currently     Types: Marijuana, Cocaine, Heroin     Comment: currently only uses marijuana       Cancer-related family history is not on file.    Current Outpatient Medications on File Prior to Visit   Medication Sig Dispense Refill    amLODIPine (NORVASC) 10 MG tablet Take 10 mg by mouth once daily.      furosemide (LASIX) 40 MG tablet Take 1 tablet (40 mg total) by mouth once daily. 30 tablet 11    hydroCHLOROthiazide (HYDRODIURIL) 25 MG tablet Take 25 mg by mouth once daily.      multivitamin (ONE DAILY MULTIVITAMIN) per tablet Take 1 tablet by mouth once daily.      spironolactone (ALDACTONE) 50 MG tablet Take 1 tablet (50 mg total) by mouth once daily. 30 tablet 11    venlafaxine (EFFEXOR-XR) 37.5 MG 24 hr capsule       cholestyramine (QUESTRAN) 4 gram packet Take 1 packet (4 g total) by mouth 2 (two) times daily. (Patient not taking: Reported on 10/15/2019) 60 packet 3    ergocalciferol (ERGOCALCIFEROL) 50,000 unit Cap Take 50,000 Units by mouth every 7 days.      ondansetron (ZOFRAN) 4 MG tablet Take 1 tablet (4 mg total) by mouth every 6 (six) hours. (Patient not taking: Reported on 10/15/2019) 12 tablet 0     No current facility-administered medications on file prior to visit.        Review of patient's allergies indicates:  No Known Allergies    Review of Systems   Constitutional: Positive for fever and weight loss.   HENT: Negative for ear pain and sore throat.    Eyes: Positive for blurred vision. Negative for double vision.   Respiratory: Positive for cough. Negative for hemoptysis and shortness of breath.    Cardiovascular: Positive for palpitations. Negative for chest pain and leg swelling.   Gastrointestinal: Negative for abdominal pain, constipation, diarrhea, heartburn and nausea.   Genitourinary: Positive for frequency. Negative for dysuria and hematuria.   Musculoskeletal:  "Negative for falls and joint pain.   Skin: Positive for itching.   Neurological: Positive for tingling. Negative for speech change, focal weakness, seizures and headaches.   Psychiatric/Behavioral: Positive for depression. The patient is not nervous/anxious.         Vital Signs: /81 (BP Location: Right arm, Patient Position: Sitting)   Pulse 92   Temp 98.2 °F (36.8 °C) (Oral)   Resp 18   Ht 5' 4" (1.626 m)   Wt 63.7 kg (140 lb 6.4 oz)   SpO2 98%   BMI 24.10 kg/m²     ECOG Performance Status: 1 - Ambulates, capable of light work    Physical Exam   Constitutional: She is oriented to person, place, and time and well-developed, well-nourished, and in no distress.   HENT:   Head: Normocephalic and atraumatic.   Mouth/Throat: Oropharynx is clear and moist.   Eyes: EOM are normal. No scleral icterus.   Neck: Normal range of motion. Neck supple.   Pulmonary/Chest: No accessory muscle usage. No respiratory distress.   Abdominal: Soft. Normal appearance. She exhibits no distension.   Musculoskeletal: Normal range of motion. She exhibits no edema.   Lymphadenopathy:     She has no cervical adenopathy.        Right: No supraclavicular adenopathy present.        Left: No supraclavicular adenopathy present.   Neurological: She is alert and oriented to person, place, and time. No cranial nerve deficit. Gait normal.   Skin: Skin is warm and dry.   Psychiatric: Mood, affect and judgment normal.   Vitals reviewed.       Labs:    Imaging: I have personally reviewed the patient's available images and reports and summarized pertinent findings above in HPI.     Pathology: I have personally reviewed the patient's available pathology and summarized pertinent findings above in HPI.       - Thank you for allowing me to participate in the care of your patient.    Kristian Salamanca MD, PhD  "

## 2019-10-21 ENCOUNTER — DOCUMENTATION ONLY (OUTPATIENT)
Dept: RADIATION ONCOLOGY | Facility: CLINIC | Age: 64
End: 2019-10-21

## 2019-10-21 NOTE — PROGRESS NOTES
Arranged for the patient's radiation transportation for 10/22/19 -  at 9.30 am. Request for transportation faxed to [facility] and discussed with Ms. Gates. The patient is aware of the arrangements.

## 2019-10-22 ENCOUNTER — HOSPITAL ENCOUNTER (OUTPATIENT)
Dept: RADIATION THERAPY | Facility: HOSPITAL | Age: 64
Discharge: HOME | End: 2019-10-22
Attending: RADIOLOGY
Payer: MEDICARE

## 2019-10-22 PROCEDURE — 77290 THER RAD SIMULAJ FIELD CPLX: CPT | Mod: TC | Performed by: RADIOLOGY

## 2019-10-22 PROCEDURE — 77334 PR  RADN TREATMENT AID(S) COMPLX: ICD-10-PCS | Mod: 26,,, | Performed by: RADIOLOGY

## 2019-10-22 PROCEDURE — 77263 PR  RADIATION THERAPY PLAN COMPLEX: ICD-10-PCS | Mod: ,,, | Performed by: RADIOLOGY

## 2019-10-22 PROCEDURE — 77290 THER RAD SIMULAJ FIELD CPLX: CPT | Mod: 26,,, | Performed by: RADIOLOGY

## 2019-10-22 PROCEDURE — 77290 PR  SET RADN THERAPY FIELD COMPLEX: ICD-10-PCS | Mod: 26,,, | Performed by: RADIOLOGY

## 2019-10-22 PROCEDURE — 77263 THER RADIOLOGY TX PLNG CPLX: CPT | Mod: ,,, | Performed by: RADIOLOGY

## 2019-10-22 PROCEDURE — 77334 RADIATION TREATMENT AID(S): CPT | Mod: 26,,, | Performed by: RADIOLOGY

## 2019-10-22 PROCEDURE — 77014 HC CT GUIDANCE RADIATION THERAPY FLDS PLACEMENT: CPT | Mod: TC | Performed by: RADIOLOGY

## 2019-10-22 PROCEDURE — 77334 RADIATION TREATMENT AID(S): CPT | Mod: TC | Performed by: RADIOLOGY

## 2019-10-25 ENCOUNTER — HOSPITAL ENCOUNTER (EMERGENCY)
Facility: OTHER | Age: 64
Discharge: HOME | End: 2019-10-25
Attending: EMERGENCY MEDICINE
Payer: MEDICARE

## 2019-10-25 VITALS
DIASTOLIC BLOOD PRESSURE: 78 MMHG | WEIGHT: 130 LBS | TEMPERATURE: 100 F | HEART RATE: 77 BPM | HEIGHT: 64 IN | RESPIRATION RATE: 31 BRPM | OXYGEN SATURATION: 96 % | SYSTOLIC BLOOD PRESSURE: 138 MMHG | BODY MASS INDEX: 22.2 KG/M2

## 2019-10-25 DIAGNOSIS — R07.89 CHEST TIGHTNESS: ICD-10-CM

## 2019-10-25 DIAGNOSIS — J10.1 INFLUENZA B: Primary | ICD-10-CM

## 2019-10-25 DIAGNOSIS — R05.9 COUGH: ICD-10-CM

## 2019-10-25 DIAGNOSIS — R53.1 WEAKNESS: ICD-10-CM

## 2019-10-25 LAB
ALBUMIN SERPL BCP-MCNC: 3.2 G/DL (ref 3.5–5.2)
ALP SERPL-CCNC: 145 U/L (ref 55–135)
ALT SERPL W/O P-5'-P-CCNC: 25 U/L (ref 10–44)
ANION GAP SERPL CALC-SCNC: 8 MMOL/L (ref 8–16)
AST SERPL-CCNC: 59 U/L (ref 10–40)
BASOPHILS # BLD AUTO: 0.02 K/UL (ref 0–0.2)
BASOPHILS NFR BLD: 0.4 % (ref 0–1.9)
BILIRUB SERPL-MCNC: 0.7 MG/DL (ref 0.1–1)
BUN SERPL-MCNC: 4 MG/DL (ref 8–23)
CALCIUM SERPL-MCNC: 8.9 MG/DL (ref 8.7–10.5)
CHLORIDE SERPL-SCNC: 103 MMOL/L (ref 95–110)
CO2 SERPL-SCNC: 23 MMOL/L (ref 23–29)
CREAT SERPL-MCNC: 0.7 MG/DL (ref 0.5–1.4)
DIFFERENTIAL METHOD: ABNORMAL
EOSINOPHIL # BLD AUTO: 0 K/UL (ref 0–0.5)
EOSINOPHIL NFR BLD: 0.7 % (ref 0–8)
ERYTHROCYTE [DISTWIDTH] IN BLOOD BY AUTOMATED COUNT: 15.4 % (ref 11.5–14.5)
EST. GFR  (AFRICAN AMERICAN): >60 ML/MIN/1.73 M^2
EST. GFR  (NON AFRICAN AMERICAN): >60 ML/MIN/1.73 M^2
GLUCOSE SERPL-MCNC: 83 MG/DL (ref 70–110)
HCT VFR BLD AUTO: 32.9 % (ref 37–48.5)
HGB BLD-MCNC: 11.6 G/DL (ref 12–16)
IMM GRANULOCYTES # BLD AUTO: 0.05 K/UL (ref 0–0.04)
IMM GRANULOCYTES NFR BLD AUTO: 1.1 % (ref 0–0.5)
INFLUENZA A, MOLECULAR: NEGATIVE
INFLUENZA B, MOLECULAR: POSITIVE
LYMPHOCYTES # BLD AUTO: 1.4 K/UL (ref 1–4.8)
LYMPHOCYTES NFR BLD: 31.1 % (ref 18–48)
MCH RBC QN AUTO: 28.4 PG (ref 27–31)
MCHC RBC AUTO-ENTMCNC: 35.3 G/DL (ref 32–36)
MCV RBC AUTO: 81 FL (ref 82–98)
MONOCYTES # BLD AUTO: 0.7 K/UL (ref 0.3–1)
MONOCYTES NFR BLD: 14.3 % (ref 4–15)
NEUTROPHILS # BLD AUTO: 2.4 K/UL (ref 1.8–7.7)
NEUTROPHILS NFR BLD: 52.4 % (ref 38–73)
NRBC BLD-RTO: 0 /100 WBC
PLATELET # BLD AUTO: 92 K/UL (ref 150–350)
PLATELET BLD QL SMEAR: ABNORMAL
PMV BLD AUTO: 12.6 FL (ref 9.2–12.9)
POTASSIUM SERPL-SCNC: 3.9 MMOL/L (ref 3.5–5.1)
PROT SERPL-MCNC: 8.3 G/DL (ref 6–8.4)
RBC # BLD AUTO: 4.08 M/UL (ref 4–5.4)
SODIUM SERPL-SCNC: 134 MMOL/L (ref 136–145)
SPECIMEN SOURCE: ABNORMAL
WBC # BLD AUTO: 4.54 K/UL (ref 3.9–12.7)

## 2019-10-25 PROCEDURE — 96374 THER/PROPH/DIAG INJ IV PUSH: CPT

## 2019-10-25 PROCEDURE — 80053 COMPREHEN METABOLIC PANEL: CPT

## 2019-10-25 PROCEDURE — 87502 INFLUENZA DNA AMP PROBE: CPT

## 2019-10-25 PROCEDURE — 99284 EMERGENCY DEPT VISIT MOD MDM: CPT | Mod: 25

## 2019-10-25 PROCEDURE — 93010 ELECTROCARDIOGRAM REPORT: CPT | Mod: 76,,, | Performed by: INTERNAL MEDICINE

## 2019-10-25 PROCEDURE — 93010 EKG 12-LEAD: ICD-10-PCS | Mod: 76,,, | Performed by: INTERNAL MEDICINE

## 2019-10-25 PROCEDURE — 25000003 PHARM REV CODE 250: Performed by: EMERGENCY MEDICINE

## 2019-10-25 PROCEDURE — 93005 ELECTROCARDIOGRAM TRACING: CPT

## 2019-10-25 PROCEDURE — 93010 ELECTROCARDIOGRAM REPORT: CPT | Mod: ,,, | Performed by: INTERNAL MEDICINE

## 2019-10-25 PROCEDURE — 63600175 PHARM REV CODE 636 W HCPCS: Performed by: EMERGENCY MEDICINE

## 2019-10-25 PROCEDURE — 85025 COMPLETE CBC W/AUTO DIFF WBC: CPT

## 2019-10-25 RX ORDER — OSELTAMIVIR PHOSPHATE 75 MG/1
75 CAPSULE ORAL
Status: COMPLETED | OUTPATIENT
Start: 2019-10-25 | End: 2019-10-25

## 2019-10-25 RX ORDER — BENZONATATE 100 MG/1
100 CAPSULE ORAL 3 TIMES DAILY PRN
Qty: 20 CAPSULE | Refills: 0 | Status: SHIPPED | OUTPATIENT
Start: 2019-10-25 | End: 2019-11-04

## 2019-10-25 RX ORDER — KETOROLAC TROMETHAMINE 30 MG/ML
10 INJECTION, SOLUTION INTRAMUSCULAR; INTRAVENOUS
Status: COMPLETED | OUTPATIENT
Start: 2019-10-25 | End: 2019-10-25

## 2019-10-25 RX ORDER — ACETAMINOPHEN 500 MG
1000 TABLET ORAL
Status: COMPLETED | OUTPATIENT
Start: 2019-10-25 | End: 2019-10-25

## 2019-10-25 RX ORDER — OSELTAMIVIR PHOSPHATE 75 MG/1
75 CAPSULE ORAL 2 TIMES DAILY
Qty: 10 CAPSULE | Refills: 0 | Status: SHIPPED | OUTPATIENT
Start: 2019-10-25 | End: 2019-10-30

## 2019-10-25 RX ORDER — NAPROXEN SODIUM 550 MG/1
550 TABLET ORAL 2 TIMES DAILY PRN
Qty: 30 TABLET | Refills: 0 | Status: SHIPPED | OUTPATIENT
Start: 2019-10-25

## 2019-10-25 RX ADMIN — OSELTAMIVIR PHOSPHATE 75 MG: 75 CAPSULE ORAL at 02:10

## 2019-10-25 RX ADMIN — KETOROLAC TROMETHAMINE 10 MG: 30 INJECTION, SOLUTION INTRAMUSCULAR; INTRAVENOUS at 02:10

## 2019-10-25 RX ADMIN — ACETAMINOPHEN 1000 MG: 500 TABLET ORAL at 02:10

## 2019-10-25 NOTE — ED NOTES
Patient moved to ED room 1 via [facility], patient assisted onto stretcher and changed into a gown. Patient placed on cardiac monitor, continuous pulse oximetry and automatic blood pressure cuff. Bed placed in low locked position, side rails up x 2, call light is within reach of patient or family, orientation to room and explanation of wait provided to family and patient, alarms set and turned on for monitor and pulse ox, awaiting MD evaluation and orders, will continue to monitor.

## 2019-10-25 NOTE — ED NOTES
Patient identifiers verified and correct for Mar Jin.    LOC: The patient is awake, alert and aware of environment with an appropriate affect, the patient is oriented x 3 and speaking appropriately.  APPEARANCE: Patient resting comfortably and in no acute distress, patient is clean and well groomed, patient's clothing is properly fastened.  SKIN: The skin is warm and dry, color consistent with ethnicity, patient has normal skin turgor and moist mucus membranes, skin intact, no breakdown or bruising noted.  MUSCULOSKELETAL: Patient moving all extremities spontaneously, no obvious swelling or deformities noted.  RESPIRATORY: Airway is open and patent, respirations are spontaneous, patient has a normal effort and rate, no accessory muscle use noted, bilateral breath sounds clear in all lobes.  CARDIAC: Patient has a normal rate and regular rhythm, no periphreal edema noted, capillary refill < 3 seconds.  ABDOMEN: Soft and non tender to palpation, no distention noted.  NEUROLOGIC: PERRL, 2 mm bilaterally, eyes open spontaneously, behavior appropriate to situation, follows commands, facial expression symmetrical, bilateral hand grasp equal and even, purposeful motor response noted, normal sensation in all extremities when touched with a finger.  Fall risk band applied to patient at this time.  Family remains at bedside.

## 2019-10-25 NOTE — ED TRIAGE NOTES
"Pt reports + body aches, chill, fever and generalized weakness/fatigue w/ new onset yesterday. Pt reports recent diagnosis of lung and liver cancer, " And they are starting me on chemo and radiation on Monday". Denies diarrhea, N/V.   "

## 2019-10-25 NOTE — ED PROVIDER NOTES
"Encounter Date: 10/25/2019    SCRIBE #1 NOTE: I, Tahmina Silveira, am scribing for, and in the presence of, Dr. Alicea.       History     Chief Complaint   Patient presents with    Generalized Body Aches     Per NOMES pt reports + fever, chills, body aches w/ generalzied fatigue since yesterday. Report soon to start chemo for liver/lung CA     Time seen by provider: 1:00 PM    This is a 63 y.o. female with hx of HTN, HLD, and liver/lung cancer who presents with complaint of generalized body aches. She reports fever of 101 degrees yesterday and 102 degrees today, chills, loss of appetite, cough, shortness of breath, palpitations, nausea without vomiting, and "throbbing" headache. All sx began yesterday. She has taken tylenol and motrin with no relief. She has decreased her cigarette use from 1 PPD to 1 to 2 cigarettes a day. Her PCP is Dr. Gonzalez at [facility]. She is due to start radiation and chemotherapy next week. This is the extent of the patient's complaints at this time.    The history is provided by the patient.     Review of patient's allergies indicates:  No Known Allergies  Past Medical History:   Diagnosis Date    Aortic atherosclerosis 7/21/2019    Cirrhosis 7/19/2019    Dermatitis, eczematoid 7/19/2019    Hepatitis C     Hepatitis C virus infection 7/19/2019    Hepatoma 7/29/2019    History of acute pancreatitis 7/21/2019    2018    HTN (hypertension) 7/19/2019    Hyperlipidemia 7/19/2019    Hypertension     Mediastinal mass     Neuropathy     Other ascites 7/19/2019    Polyneuropathy 7/19/2019    Right lower lobe lung mass     Vitamin D deficiency 7/19/2019     Past Surgical History:   Procedure Laterality Date    ENDOBRONCHIAL ULTRASOUND N/A 8/27/2019    Procedure: ENDOBRONCHIAL ULTRASOUND (EBUS);  Surgeon: Renee Merino MD;  Location: [facility] OR [address] [place] [address];  Service: Pulmonary;  Laterality: N/A;    ENDOSCOPIC ULTRASOUND OF UPPER GASTROINTESTINAL TRACT N/A 9/16/2019    Procedure: " ULTRASOUND, UPPER GI TRACT, ENDOSCOPIC;  Surgeon: Steven Nam MD;  Location: [facility] ENDO ([address] [place] [address]);  Service: Endoscopy;  Laterality: N/A;  CBC, PT/INR scheduled before procedure (liver cirrhosis).    PERITONEOCENTESIS N/A 8/7/2019    Procedure: PARACENTESIS, ABDOMINAL;  Surgeon: Jonathan Ding MD;  Location: [facility] CATH LAB;  Service: Radiology;  Laterality: N/A;     Family History   Problem Relation Age of Onset    Lung disease Mother     Heart disease Father     Hypertension Sister     HIV Brother     HIV Brother     Hypertension Sister      Social History     Tobacco Use    Smoking status: Current Every Day Smoker     Packs/day: 0.50     Types: Cigarettes    Smokeless tobacco: Never Used    Tobacco comment: currently smokes 4 cigarette per day   Substance Use Topics    Alcohol use: Not Currently     Comment: quit drinking 7 years ago    Drug use: Not Currently     Types: Marijuana, Cocaine, Heroin     Comment: currently only uses marijuana     Review of Systems   Constitutional: Positive for appetite change, chills and fever.   HENT: Negative for sore throat.    Respiratory: Positive for cough and shortness of breath.    Cardiovascular: Positive for palpitations. Negative for chest pain.   Gastrointestinal: Positive for nausea. Negative for vomiting.   Genitourinary: Negative for dysuria.   Musculoskeletal: Positive for myalgias. Negative for back pain.   Skin: Negative for rash.   Neurological: Positive for headaches. Negative for weakness.   Hematological: Does not bruise/bleed easily.       Physical Exam     Initial Vitals [10/25/19 1227]   BP Pulse Resp Temp SpO2   120/78 90 20 99.5 °F (37.5 °C) 97 %      MAP       --         Physical Exam    Nursing note and vitals reviewed.  Constitutional: She appears well-developed. No distress.   She is thin. Her skin is warm to touch, but she is not diaphoretic.   HENT:   Head: Normocephalic and atraumatic.   Eyes: EOM are normal. Pupils are equal, round,  and reactive to light.   Neck: Normal range of motion. Neck supple.   Cardiovascular: Normal rate and regular rhythm. Exam reveals no gallop and no friction rub.    Murmur heard.   Systolic murmur is present with a grade of 2/6.  Pulmonary/Chest: Breath sounds normal. No respiratory distress. She has no wheezes. She has no rhonchi. She has no rales.   Abdominal: Soft. Bowel sounds are normal. She exhibits no distension. There is no tenderness. There is no rebound and no guarding.   Musculoskeletal: Normal range of motion. She exhibits no edema or tenderness.   Neurological: She is alert and oriented to person, place, and time.   Skin: Skin is warm and dry.   Psychiatric: Her mood appears anxious.         ED Course   Procedures  Labs Reviewed   INFLUENZA A & B BY MOLECULAR - Abnormal; Notable for the following components:       Result Value    Influenza B, Molecular Positive (*)     All other components within normal limits   CBC W/ AUTO DIFFERENTIAL - Abnormal; Notable for the following components:    Hemoglobin 11.6 (*)     Hematocrit 32.9 (*)     Mean Corpuscular Volume 81 (*)     RDW 15.4 (*)     Platelets 92 (*)     Immature Granulocytes 1.1 (*)     Immature Grans (Abs) 0.05 (*)     Platelet Estimate Decreased (*)     All other components within normal limits   COMPREHENSIVE METABOLIC PANEL - Abnormal; Notable for the following components:    Sodium 134 (*)     BUN, Bld 4 (*)     Albumin 3.2 (*)     Alkaline Phosphatase 145 (*)     AST 59 (*)     All other components within normal limits     EKG Readings: (Independently Interpreted)   Normal sinus rhythm at rate of 85. No ischemia or arrhythmia.     ECG Results          EKG 12-lead (In process)  Result time 10/25/19 13:39:15    In process by Interface, Lab In [facility] (10/25/19 13:39:15)                 Narrative:    Test Reason : R53.1,    Vent. Rate : 083 BPM     Atrial Rate : 083 BPM     P-R Int : 164 ms          QRS Dur : 076 ms      QT Int : 392 ms        P-R-T Axes : 074 067 060 degrees     QTc Int : 460 ms    Normal sinus rhythm  Right atrial enlargement  Minimal voltage criteria for LVH, may be normal variant  Borderline Abnormal ECG      Referred By: [email or web address]   SELF           Confirmed By:                              EKG 12-lead (In process)  Result time 10/25/19 13:38:59    In process by Interface, Lab In [facility] (10/25/19 13:38:59)                 Narrative:    Test Reason : R07.89,    Vent. Rate : 085 BPM     Atrial Rate : 085 BPM     P-R Int : 164 ms          QRS Dur : 074 ms      QT Int : 406 ms       P-R-T Axes : 077 070 059 degrees     QTc Int : 483 ms    Normal sinus rhythm  Right atrial enlargement  Prolonged QT  Abnormal ECG      Referred By: [email or web address]   SELF           Confirmed By:                             Imaging Results          X-Ray Chest PA And Lateral (Final result)  Result time 10/25/19 13:33:23    Final result by Hua Sosa MD (10/25/19 13:33:23)                 Impression:      No significant changes      Electronically signed by: Hua Sosa MD  Date:    10/25/2019  Time:    13:33             Narrative:    EXAMINATION:  XR CHEST PA AND LATERAL    CLINICAL HISTORY:  Cough    TECHNIQUE:  PA and lateral views of the chest were performed.    COMPARISON:  No 01/20/2015 ne    FINDINGS:  Heart size normal.  The lungs are clear.  No pleural effusion                              X-Rays:   Independently Interpreted Readings:   Chest X-Ray: No focal infiltrate of effusion.     Medical Decision Making:   History:   Old Medical Records: I decided to obtain old medical records.  Independently Interpreted Test(s):   I have ordered and independently interpreted X-rays - see prior notes.  I have ordered and independently interpreted EKG Reading(s) - see prior notes  Clinical Tests:   Lab Tests: Reviewed  Radiological Study: Reviewed  Medical Tests: Ordered and Reviewed            Scribe Attestation:   Scribe #1: I performed the above scribed service  and the documentation accurately describes the services I performed. I attest to the accuracy of the note.    Attending Attestation:           Physician Attestation for Scribe:  Physician Attestation Statement for Scribe #1: I, Dr. Alicea, reviewed documentation, as scribed by Tahmina Silveira in my presence, and it is both accurate and complete.                 ED Course as of Oct 25 1953   Fri Oct 25, 2019   1238 Mar Jin, 63 y.o.  presented to the ED with c/o body aches, chills, loss of appetite, and cough with sputum production.  She reports chest pressure.  Hx of lung and liver cancer anticipated to start treatment next week.     Patient seen and medically screened by the Physician Assistant in Triage due to ED crowding.  Appropriate tests and/or medications ordered.  A medical screening exam has been performed.  The care will be assumed by myself or another provider when treatment space becomes available.  I am not assuming care of this patient at this time. 12:38 PM. MML        [MM]      ED Course User Index  [MM] Annie Tavares PA-C     Patient presents complaining of fairly sudden onset of fevers, myalgias, chills malaise multiple other symptoms. He did take Tylenol, upon arrival she is not febrile remainder vital Signs unremarkable she has a history of lung cancer, liver lesion but reports that she has not yet started chemotherapy or radiation therapy.  Laboratory studies are unremarkable. Normal white count.  However she is positive for or influenza.  Chest x-ray did not show pneumonia.  Has been started on Tamiflu.  Discussed return precautions.  Encouraged follow-up with primary care and/or her hematologist    Clinical Impression:     1. Influenza B    2. Chest tightness    3. Cough    4. Weakness                                 Seymour Alicea II, MD  10/25/19 1957

## 2019-10-26 ENCOUNTER — HOSPITAL ENCOUNTER (EMERGENCY)
Facility: OTHER | Age: 64
Discharge: HOME | End: 2019-10-26
Attending: EMERGENCY MEDICINE
Payer: MEDICARE

## 2019-10-26 VITALS
DIASTOLIC BLOOD PRESSURE: 91 MMHG | RESPIRATION RATE: 18 BRPM | BODY MASS INDEX: 22.2 KG/M2 | WEIGHT: 130 LBS | TEMPERATURE: 101 F | OXYGEN SATURATION: 96 % | HEIGHT: 64 IN | HEART RATE: 106 BPM | SYSTOLIC BLOOD PRESSURE: 141 MMHG

## 2019-10-26 DIAGNOSIS — R52 BODY ACHES: ICD-10-CM

## 2019-10-26 DIAGNOSIS — J10.1 INFLUENZA B: Primary | ICD-10-CM

## 2019-10-26 PROCEDURE — 99283 EMERGENCY DEPT VISIT LOW MDM: CPT

## 2019-10-26 PROCEDURE — 25000003 PHARM REV CODE 250: Performed by: PHYSICIAN ASSISTANT

## 2019-10-26 RX ORDER — ACETAMINOPHEN 500 MG
1000 TABLET ORAL EVERY 8 HOURS
Refills: 0 | COMMUNITY
Start: 2019-10-26 | End: 2019-11-02

## 2019-10-26 RX ORDER — OSELTAMIVIR PHOSPHATE 75 MG/1
75 CAPSULE ORAL
Status: COMPLETED | OUTPATIENT
Start: 2019-10-26 | End: 2019-10-26

## 2019-10-26 RX ORDER — IBUPROFEN 600 MG/1
600 TABLET ORAL
Status: COMPLETED | OUTPATIENT
Start: 2019-10-26 | End: 2019-10-26

## 2019-10-26 RX ORDER — ONDANSETRON 4 MG/1
4 TABLET, ORALLY DISINTEGRATING ORAL
Status: COMPLETED | OUTPATIENT
Start: 2019-10-26 | End: 2019-10-26

## 2019-10-26 RX ADMIN — IBUPROFEN 600 MG: 600 TABLET, FILM COATED ORAL at 04:10

## 2019-10-26 RX ADMIN — OSELTAMIVIR PHOSPHATE 75 MG: 75 CAPSULE ORAL at 04:10

## 2019-10-26 RX ADMIN — ONDANSETRON 4 MG: 4 TABLET, ORALLY DISINTEGRATING ORAL at 04:10

## 2019-10-26 NOTE — ED NOTES
Appearance: Pt awake, alert & oriented to person, place & time. Pt in no acute distress at present time. Pt is clean and well groomed with clothes appropriately fastened. + reporting flu like symptoms, including cough, fever, chills, body aches.   Skin: Skin warm, dry & intact. Color consistent with ethnicity. Mucous membranes moist. No breakdown or brusing noted.   Musculoskeletal: Patient moving all extremities well, no obvious swelling or deformities noted.   Respiratory: Respirations spontaneous, even, and non-labored. Visible chest rise noted. Airway is open and patent. No accessory muscle use noted.   Neurologic: Sensation is intact. Speech is clear and appropriate. Eyes open spontaneously, behavior appropriate to situation, follows commands,  purposeful motor response noted.   Cardiac:  No Bilateral lower extremity edema. Cap refill is <3 seconds. Pt denies active chest pains, dizziness, blurred vision, weakness or fatigue at this time.   Abdomen: Pt denies active abd pains, cramping or discomfort, No N/V/D at this time.   : Pt reports no dysuria or hematuria.

## 2019-10-26 NOTE — ED NOTES
Pt reporting she was dx'ed with flu yesterday, reporting she had rx written for tamiflu, was not ana cristina to get it filled due to insurance issues, requesting a dose here in ED today. Pt AAOx4 and appropriate at this time. Respirations even and unlabored. No acute distress noted.

## 2019-10-26 NOTE — DISCHARGE INSTRUCTIONS
I have called in Tamiflu prescription to the [facility] [address].  Tamiflu prescription is covered by your insurance.    Tessalon perles are not covered.  You can take Coricidin HBP for cough over the counter

## 2019-10-27 NOTE — ED PROVIDER NOTES
Encounter Date: 10/26/2019       History     Chief Complaint   Patient presents with    Medication Refill     seen yesterday and dx with flu. unable to get tamiflu     63-year-old female with history of hypertension, liver/lung cancer, presents to the ER due to issues with obtaining her Tamiflu prescription.  Patient was seen in the ER yesterday presenting with generalized body aches, fever, chills, cough, shortness of breath, nausea, vomiting and headache. She was diagnosed with influenza B.  Chest x-ray was clear.  She was sent home with prescription for Tessalon Perles, Tamiflu, and naproxen.  Patient states that her normal pharmacy is not open on weekends and they are unable to fill the prescriptions at [facility].  She took Tylenol and Coricidin HBP over-the-counter this morning, but reports body aches returning this afternoon.  She denies any new symptoms today compared to her ER visit yesterday.  Patient is due to start radiation and chemotherapy next week.  She has no other complaints at this time.            Review of patient's allergies indicates:  No Known Allergies  Past Medical History:   Diagnosis Date    Aortic atherosclerosis 7/21/2019    Cirrhosis 7/19/2019    Dermatitis, eczematoid 7/19/2019    Hepatitis C     Hepatitis C virus infection 7/19/2019    Hepatoma 7/29/2019    History of acute pancreatitis 7/21/2019    2018    HTN (hypertension) 7/19/2019    Hyperlipidemia 7/19/2019    Hypertension     Mediastinal mass     Neuropathy     Other ascites 7/19/2019    Polyneuropathy 7/19/2019    Right lower lobe lung mass     Vitamin D deficiency 7/19/2019     Past Surgical History:   Procedure Laterality Date    ENDOBRONCHIAL ULTRASOUND N/A 8/27/2019    Procedure: ENDOBRONCHIAL ULTRASOUND (EBUS);  Surgeon: Renee Merino MD;  Location: [facility] OR [address] [place] [address];  Service: Pulmonary;  Laterality: N/A;    ENDOSCOPIC ULTRASOUND OF UPPER GASTROINTESTINAL TRACT N/A 9/16/2019    Procedure: ULTRASOUND,  UPPER GI TRACT, ENDOSCOPIC;  Surgeon: Steven Nam MD;  Location: [facility] ENDO ([address] [place] [address]);  Service: Endoscopy;  Laterality: N/A;  CBC, PT/INR scheduled before procedure (liver cirrhosis).    PERITONEOCENTESIS N/A 8/7/2019    Procedure: PARACENTESIS, ABDOMINAL;  Surgeon: Jonathan Ding MD;  Location: [facility] CATH LAB;  Service: Radiology;  Laterality: N/A;     Family History   Problem Relation Age of Onset    Lung disease Mother     Heart disease Father     Hypertension Sister     HIV Brother     HIV Brother     Hypertension Sister      Social History     Tobacco Use    Smoking status: Current Every Day Smoker     Packs/day: 0.50     Types: Cigarettes    Smokeless tobacco: Never Used    Tobacco comment: currently smokes 4 cigarette per day   Substance Use Topics    Alcohol use: Not Currently     Comment: quit drinking 7 years ago    Drug use: Not Currently     Types: Marijuana, Cocaine, Heroin     Comment: currently only uses marijuana     Review of Systems   Constitutional: Positive for chills and fever.   Respiratory: Positive for cough. Negative for shortness of breath.    Cardiovascular: Negative for chest pain.   Gastrointestinal: Positive for nausea. Negative for abdominal pain and vomiting.   Musculoskeletal: Positive for myalgias.   Skin: Negative for rash and wound.       Physical Exam     Initial Vitals [10/26/19 1534]   BP Pulse Resp Temp SpO2   (!) 141/91 106 18 (!) 100.4 °F (38 °C) 96 %      MAP       --         Physical Exam    Nursing note and vitals reviewed.  Constitutional: She appears well-developed and well-nourished.   HENT:   Head: Atraumatic.   Mouth/Throat: Oropharynx is clear and moist.   Eyes: Conjunctivae and EOM are normal. Pupils are equal, round, and reactive to light.   Neck: Normal range of motion. Neck supple.   Cardiovascular: Normal rate, regular rhythm and intact distal pulses.   Pulmonary/Chest: Breath sounds normal. No respiratory distress. She has no wheezes. She has  no rales.   Neurological: She is alert and oriented to person, place, and time.   Skin: Capillary refill takes less than 2 seconds. No rash noted.   Psychiatric: She has a normal mood and affect.         ED Course   Procedures  Labs Reviewed - No data to display       Imaging Results    None                APC / Resident Notes:   Patient presents to the ER due to difficulty filling her Tamiflu prescription.  I contacted the [facility]'s pharmacy where the patient attempted to fill her prescription.  The pharmacist stated that they did not have an actual copy of the patient's Medicare card and were unable to pull up her insurance information electronically.  I was able to give them her Medicare card number as documented in the patient's chart.  I have called in the patient's prescription for Tamiflu and naproxen and the pharmacist states that this will be covered for only 1 dollar.  Tessalon Perles are not covered by patient's insurance so I have advised that she take Coricidin HBP for symptom relief, and continue Tylenol and naproxen for pain and fever.  Patient says she feels very relieved that she can get her medication.  She feels comfortable with plan for discharge and will follow up with her physicians this week as scheduled.  She understands that she should inform her oncologist that she was diagnosed with influenza.  Patient is given ER return precautions.                 Clinical Impression:       ICD-10-CM ICD-9-CM   1. Influenza B J10.1 487.1   2. Body aches R52 780.96                                THEE Rahman  10/26/19 [id number]

## 2019-10-28 ENCOUNTER — TELEPHONE (OUTPATIENT)
Dept: HEMATOLOGY/ONCOLOGY | Facility: CLINIC | Age: 64
End: 2019-10-28

## 2019-10-28 PROCEDURE — 77295 PR 3D RADIOTHERAPY PLAN: ICD-10-PCS | Mod: 26,,, | Performed by: RADIOLOGY

## 2019-10-28 PROCEDURE — 77295 3-D RADIOTHERAPY PLAN: CPT | Mod: 26,,, | Performed by: RADIOLOGY

## 2019-10-28 PROCEDURE — 77334 RADIATION TREATMENT AID(S): CPT | Mod: TC | Performed by: RADIOLOGY

## 2019-10-28 PROCEDURE — 77295 3-D RADIOTHERAPY PLAN: CPT | Mod: TC | Performed by: RADIOLOGY

## 2019-10-28 PROCEDURE — 77300 PR RADIATION THERAPY,DOSIMETRY PLAN: ICD-10-PCS | Mod: 26,,, | Performed by: RADIOLOGY

## 2019-10-28 PROCEDURE — 77300 RADIATION THERAPY DOSE PLAN: CPT | Mod: 26,,, | Performed by: RADIOLOGY

## 2019-10-28 PROCEDURE — 77470 PR  SPECIAL RADIATION TREATMENT: ICD-10-PCS | Mod: 26,59,, | Performed by: RADIOLOGY

## 2019-10-28 PROCEDURE — 77470 SPECIAL RADIATION TREATMENT: CPT | Mod: 59,TC | Performed by: RADIOLOGY

## 2019-10-28 PROCEDURE — 77470 SPECIAL RADIATION TREATMENT: CPT | Mod: 26,59,, | Performed by: RADIOLOGY

## 2019-10-28 PROCEDURE — 77334 RADIATION TREATMENT AID(S): CPT | Mod: 26,,, | Performed by: RADIOLOGY

## 2019-10-28 PROCEDURE — 77300 RADIATION THERAPY DOSE PLAN: CPT | Mod: TC | Performed by: RADIOLOGY

## 2019-10-28 PROCEDURE — 77334 PR  RADN TREATMENT AID(S) COMPLX: ICD-10-PCS | Mod: 26,,, | Performed by: RADIOLOGY

## 2019-10-28 NOTE — TELEPHONE ENCOUNTER
Ms Jin scheduled today with Dr Rodgers. She is transferring her care to Dr Rodgers due to Ms Jin requesting to see a physician at [facility]. Ms Jin N/S her scheduled appointment. Called Ms Jin, no answer. Recorded message states my call can not be completed as dial. Please try your call later.

## 2019-11-01 ENCOUNTER — TELEPHONE (OUTPATIENT)
Dept: HEMATOLOGY/ONCOLOGY | Facility: CLINIC | Age: 64
End: 2019-11-01

## 2019-11-01 NOTE — TELEPHONE ENCOUNTER
Called Ms Jin, no answer. Message left on voice mail. An appointment has been schedule for Ms Jin on 11/11/2019 @ 4:00 pm with Dr Rodgers at [facility].

## 2019-11-07 NOTE — PROGRESS NOTES
"Subjective:       Patient ID: Mar Jin is a 63 y.o. female.    Chief Complaint: No chief complaint on file.    HPI 63-year-old female with non-small cell lung cancer and hepatocellular carcinoma.    She is planned to have chemotherapy and radiation therapy for her lung cancer prior to having her hepatocellular carcinoma addressed.  She is a former patient of  and is transferring her care to due to her desire to be treated at [facility] [place], operated by [facility].    Cancer history:hepatitis C, being followed by Transplant, underwent scans in August 2019 and was noted to have a 4-cm liver lesion that was indeterminate.  She also had a 2.2 cm spiculated lung lesion.  She was referred to see Dr. Merino, underwent bronchoscopy on 08/27/2019 and lymph node stations were negative.  She was then referred to undergo a EUS, which she did on 09/16/2019 and mediastinal lymph node biopsy revealed squamous cell carcinoma.    She underwent MRI abdomen on 10/2/19 revealing  "Cirrhotic appearance of the liver with a 4.3 cm mass in the right hepatic lobe demonstrating characteristics consistent with hepatocellular carcinoma.  Few additional subcentimeter arterial enhancing lesions that are indeterminate without washout. 3.8 cm mass in the medial right lower lobe abutting the mediastinal mass, better visualized on CT 07/29/2019. Moderate volume ascites"  PET scan on 10/2/19 revealed  "Known right lower lobe pulmonary nodule demonstrates increased tracer uptake, consistent with biopsy-proven squamous cell carcinoma.  No other areas of increased uptake suspicious for malignancy are identified"  Discussed in Thoracic conference and was not felt to be a candidate for surgical resection due to her bulky level 7 disease and underlying hepatic disease.  "Biopsy proven squamous cell lung cancer of the RLL versus level 7 LN without evidence of distant disease. Liver lesion consistent with HCC based on abdominal MRI.    Recommend chemoRT for treatment of NSCLC " "prior to HCC treatment"  Review of Systems    Objective:      Physical Exam    Assessment:       1. Malignant neoplasm of lower lobe of right lung    2. Cancer, hepatocellular        Plan:         Patient was not able to stay so was not seen.  She will reschedule.     "

## 2019-11-08 ENCOUNTER — TELEPHONE (OUTPATIENT)
Dept: RADIATION ONCOLOGY | Facility: CLINIC | Age: 64
End: 2019-11-08

## 2019-11-08 NOTE — TELEPHONE ENCOUNTER
Spoke with pt. Confirmed her appointment with Dr. Rodgers for 11/11 @ 4pm. Pt states she received her appointment slip in the mail and she will be there. She states she was in the ER when her last appointment was scheduled and was diagnosed with the flu. Pt states she is feeling better now.

## 2019-11-11 ENCOUNTER — OFFICE VISIT (OUTPATIENT)
Dept: HEMATOLOGY/ONCOLOGY | Facility: CLINIC | Age: 64
End: 2019-11-11
Attending: INTERNAL MEDICINE
Payer: MEDICARE

## 2019-11-11 VITALS
SYSTOLIC BLOOD PRESSURE: 118 MMHG | HEART RATE: 98 BPM | WEIGHT: 139.13 LBS | TEMPERATURE: 99 F | DIASTOLIC BLOOD PRESSURE: 79 MMHG | OXYGEN SATURATION: 100 % | RESPIRATION RATE: 16 BRPM | BODY MASS INDEX: 23.75 KG/M2 | HEIGHT: 64 IN

## 2019-11-11 DIAGNOSIS — C22.0 CANCER, HEPATOCELLULAR: ICD-10-CM

## 2019-11-11 DIAGNOSIS — C34.31 MALIGNANT NEOPLASM OF LOWER LOBE OF RIGHT LUNG: Primary | ICD-10-CM

## 2019-11-11 PROCEDURE — 99999 PR PBB SHADOW E&M-EST. PATIENT-LVL III: ICD-10-PCS | Mod: PBBFAC,,, | Performed by: INTERNAL MEDICINE

## 2019-11-11 PROCEDURE — 99499 UNLISTED E&M SERVICE: CPT | Mod: S$PBB,,, | Performed by: INTERNAL MEDICINE

## 2019-11-11 PROCEDURE — 99213 OFFICE O/P EST LOW 20 MIN: CPT | Mod: PBBFAC | Performed by: INTERNAL MEDICINE

## 2019-11-11 PROCEDURE — 99999 PR PBB SHADOW E&M-EST. PATIENT-LVL III: CPT | Mod: PBBFAC,,, | Performed by: INTERNAL MEDICINE

## 2019-11-11 PROCEDURE — 99499 NO LOS: ICD-10-PCS | Mod: S$PBB,,, | Performed by: INTERNAL MEDICINE

## 2019-11-22 ENCOUNTER — TELEPHONE (OUTPATIENT)
Dept: HEMATOLOGY/ONCOLOGY | Facility: CLINIC | Age: 64
End: 2019-11-22

## 2019-11-22 ENCOUNTER — TELEPHONE (OUTPATIENT)
Dept: RADIATION ONCOLOGY | Facility: CLINIC | Age: 64
End: 2019-11-22

## 2019-11-22 NOTE — TELEPHONE ENCOUNTER
----- Message from Jaky Ramirez RN sent at 11/21/2019  9:57 AM CST -----  Regarding: FW: Reschedule Med Onc/SIM  Can you reach out to this patient and see when she is able to be rescheduled as she was not seen on the 11th when she came in?    ----- Message -----  From: Wanda Stein RN  Sent: 11/15/2019  10:08 AM CST  To: Kristian Salamanca MD, [facility] Staff  Subject: Reschedule Med Onc/SIM                           Please contact us once pt has been seen. I saw that she checked in for appointment but note states pt did not see physician. Below is update from Dr. Salamanca just to keep you all in the loop with pt's care.  Sincerely  Wanda Stein RN    Unfortunately this patient still wasn't seen by Dr. Rodgers at [facility]. It has been too long since her CT Sim to start her without repeating a simulation. Once we know when her chemo date is scheduled, we need to have her come back in the week before for CT Simulation (no charge).

## 2019-11-22 NOTE — TELEPHONE ENCOUNTER
Spoke with pt and informed her of her re SIM CT at [facility] on 12/3 @ 11am after her appointment with Dr. Rodgers.  Pt verbalized understanding.

## 2019-12-02 ENCOUNTER — TELEPHONE (OUTPATIENT)
Dept: RADIATION ONCOLOGY | Facility: CLINIC | Age: 64
End: 2019-12-02

## 2019-12-02 NOTE — TELEPHONE ENCOUNTER
Left message to see if pt needed to reschedule her consult with Dr. Rodgers and Roman and to inform her that we were cancelling her re-SIM until she has her consult with [facility].

## 2020-07-29 ENCOUNTER — TELEPHONE (OUTPATIENT)
Dept: HEPATOLOGY | Facility: CLINIC | Age: 65
End: 2020-07-29

## 2020-07-29 NOTE — TELEPHONE ENCOUNTER
Received call from [facility] [phone]. Spoke with the Pharmacist. Pt last seen 8/2019. We are unable to reorder medications without checking kidney function.  We will need to follow up with the patient or have her PCP reorder the medication.    Call placed to the patient [phone]. No Answer. Left VM message. This is from the Liver Clinic at [facility]. We cannot fill your prescription without up to date labs. You were last seen 8/2019. Please call us back to schedule an appt with [facility] at [phone]. You need Labs and US to see how you Liver is doing. You can call your PCP to reorder the medicine. Please call us back at [phone] to schedule and appt and get your medications reordered.

## 2020-07-29 NOTE — TELEPHONE ENCOUNTER
----- Message from Chhaya Heredia MA sent at 7/29/2020  1:40 PM CDT -----  Contact: [facility]  Pt haven't  been seen over a year. Please advise   ----- Message -----  From: Alanna Heredia  Sent: 7/29/2020   9:25 AM CDT  To: Charlotte Samuels Staff    Calling about medication refill spironolactone (ALDACTONE) 50 MG tablet      Call Back : [phone]  Fax : [phone]

## 2020-08-25 ENCOUNTER — TELEPHONE (OUTPATIENT)
Dept: HEMATOLOGY/ONCOLOGY | Facility: CLINIC | Age: 65
End: 2020-08-25

## 2020-08-25 NOTE — TELEPHONE ENCOUNTER
Nurse spoke with patient.  She is not receiving chemo treatment elsewhere.   She states she has been too scared to call to r/s her missed appointment at the end of last year.  She has been thinking about wanting to get treatment started as of recently.  She is requesting dr vallejo's nurse to reach out to her in a couple weeks, as she will make her final decision then.    Message routed to dr maldonado/genevieve/verna

## 2020-09-10 ENCOUNTER — TELEPHONE (OUTPATIENT)
Dept: HEMATOLOGY/ONCOLOGY | Facility: CLINIC | Age: 65
End: 2020-09-10

## 2020-09-10 NOTE — TELEPHONE ENCOUNTER
Call made to patient to discuss her final decision concerning her decision to get treatment. No answer at this time. VML asking patient to please reach back out to us in clinic and we can have her speak with Dr Vallejo when he returns next week or his NP Vashti if she would like to discuss her decision this week.       ----- Message from Grace Girard RN sent at 8/25/2020  9:24 AM CDT -----  Patient Calls    Elizabeth Delgado RN routed conversation to You; Dawna Maldonado MD; Hua Vallejo MD 7 minutes ago (9:16 AM)    Elizabeth Delgado RN 7 minutes ago (9:16 AM)         Nurse spoke with patient.  She is not receiving chemo treatment elsewhere.   She states she has been too scared to call to r/s her missed appointment at the end of last year.  She has been thinking about wanting to get treatment started as of recently.  She is requesting dr vallejo's nurse to reach out to her in a couple weeks, as she will make her final decision then.     Message routed to dr maldonado/genevieve/grace

## 2020-09-17 ENCOUNTER — TELEPHONE (OUTPATIENT)
Dept: HEPATOLOGY | Facility: CLINIC | Age: 65
End: 2020-09-17

## 2020-09-17 NOTE — TELEPHONE ENCOUNTER
----- Message from Jasmyn Faustin sent at 9/17/2020  8:23 AM CDT -----  Pt is calling for a referral to pain management  Dr Dylon Ashton#[phone] fax#[phone]

## 2020-09-17 NOTE — TELEPHONE ENCOUNTER
Call placed to the Office of Dr Dylon Ashton at [phone] regarding referral to Pain Management Clinic. Spoke with Vane. She stated they have not asked for any referral's or information regarding the patient. Patient has never been seen in our Clinic.    Call placed to the patient and message relayed.  You will have to get in contact with your PCP to write the Order to Pain Management.    Since I have you on the phone and we have been unable to reach you; I would like to schedule you for a Follow Up appointment to the Liver Clinic.    I will have to call you back on that. I am feeling well right now. And I know they want to do Chemo and Radiation on me. I know my body. I can call you back when I decide too. Not right now.  I just need something for pain. Patient encouraged to reach out to her Primary Care Provider.

## 2020-09-17 NOTE — TELEPHONE ENCOUNTER
Received call from the patient again.  She had called the Office of Dr Dylon Ashton back and spoke with Vane.   Patient stated I need for records of my Scans that I had done for Dr Porter faxed to them.    Call placed to the Office of Dr Dylon Ashton at [phone] and spoke with Vane.  Vane stated Mrs Jin needs records for them to consider if they will give an appointment.  Yes, we will need some records faxed to us.  Fax # for the Office is [phone].  Records fax per request of the patient.  Received receipt of confirmation fax received.    Call returned to the patient informed your records you requested are at Dr Ashton's Office.

## 2021-04-20 ENCOUNTER — TELEPHONE (OUTPATIENT)
Dept: HEPATOLOGY | Facility: CLINIC | Age: 66
End: 2021-04-20

## 2021-04-25 ENCOUNTER — HOSPITAL ENCOUNTER (EMERGENCY)
Facility: OTHER | Age: 66
Discharge: HOME | End: 2021-04-25
Attending: EMERGENCY MEDICINE
Payer: MEDICARE

## 2021-04-25 VITALS
HEART RATE: 96 BPM | RESPIRATION RATE: 20 BRPM | TEMPERATURE: 98 F | DIASTOLIC BLOOD PRESSURE: 70 MMHG | HEIGHT: 64 IN | WEIGHT: 90 LBS | BODY MASS INDEX: 15.36 KG/M2 | SYSTOLIC BLOOD PRESSURE: 112 MMHG | OXYGEN SATURATION: 97 %

## 2021-04-25 DIAGNOSIS — R06.02 SHORTNESS OF BREATH: ICD-10-CM

## 2021-04-25 DIAGNOSIS — C34.31 MALIGNANT NEOPLASM OF LOWER LOBE OF RIGHT LUNG: Primary | ICD-10-CM

## 2021-04-25 DIAGNOSIS — C22.0 HEPATOCELLULAR CARCINOMA: ICD-10-CM

## 2021-04-25 DIAGNOSIS — R04.2 HEMOPTYSIS: ICD-10-CM

## 2021-04-25 LAB — POCT GLUCOSE: 93 MG/DL (ref 70–110)

## 2021-04-25 PROCEDURE — 82962 GLUCOSE BLOOD TEST: CPT

## 2021-04-25 PROCEDURE — 99283 EMERGENCY DEPT VISIT LOW MDM: CPT | Mod: 25

## 2021-04-25 RX ORDER — DIPHENOXYLATE HYDROCHLORIDE AND ATROPINE SULFATE 2.5; .025 MG/1; MG/1
1 TABLET ORAL 4 TIMES DAILY PRN
Qty: 20 TABLET | Refills: 0 | Status: SHIPPED | OUTPATIENT
Start: 2021-04-25 | End: 2021-05-05

## 2022-02-28 NOTE — PROGRESS NOTES
HEPATOLOGY CONSULTATION    Referring Physician: WANDA Shirley  Current Corresponding Physician: WANDA Shirley    Reason for Consultation: Consultation for evaluation of Hepatitis C    History of Present Illness: Mar Jin is a 63 y.o. female with a PMHx htn, vit D deficiency, eczematoid dermatitishyperlipidemia and leg weakness diagnosed as polyneuropathy, who presents for evaluation of HCV and a liver mass    --Dx with HCV Aug 2017; G1A; VL 2,760,000 IU/mL 5/28/19; no NS5A resisitance  -HAV IgG+; HBsAg-, HBcAb+, HBsAb+  --HCV risk factors: remote IVDA and tattoos, babyboomer  --APRI 1.404; fibrosure F4; fibroscan kPa 66.9 (F4)-c/w with diagnosis of cirrhosis    Labs 6/10/19: ALT 46, AST 82, Tbil 0.6, ALKP 173. plts 146, Tbil 0.6, Na 138, creat 0.6, INR 1.2    CT scan 4/12/18- no cirrhosis noted  US at [facility] 7/1/19: cirrhosis, ascites, 4.1 cm lesion in the right lobe of the liver  AFP 54.5    --ascites began 2 weeks ago  --no issues with cognition  --no history of EV bleeding    --no significant alcohol  --neuropathy- no response to gabapentin; rides a motorized vehicle x 1 year- has to walk bending over   --lost 10 lbs    -- passed away; no children but has support- family lives in same apt complex      Chief Complaint   Patient presents with    Hepatitis C       Past Medical History:   Diagnosis Date    Cirrhosis 7/19/2019    Dermatitis, eczematoid 7/19/2019    Hepatitis C     Hepatitis C virus infection 7/19/2019    HTN (hypertension) 7/19/2019    Hyperlipidemia 7/19/2019    Hypertension     Neuropathy     Other ascites 7/19/2019    Polyneuropathy 7/19/2019    Scabies     Vitamin D deficiency 7/19/2019     Outpatient Encounter Medications as of 7/19/2019   Medication Sig Dispense Refill    amLODIPine (NORVASC) 10 MG tablet Take 10 mg by mouth once daily.      hydroCHLOROthiazide (HYDRODIURIL) 25 MG tablet Take 25 mg by mouth once daily.      lisinopril 10 MG tablet Take  10 mg by mouth once daily.      gabapentin (NEURONTIN) 300 MG capsule Take 300 mg by mouth 3 (three) times daily.      ondansetron (ZOFRAN) 4 MG tablet Take 1 tablet (4 mg total) by mouth every 6 (six) hours. 12 tablet 0    venlafaxine (EFFEXOR-XR) 37.5 MG 24 hr capsule        No facility-administered encounter medications on file as of 7/19/2019.      Review of patient's allergies indicates:   Allergen Reactions    Codeine      No family history on file.    Social History     Socioeconomic History    Marital status: Legally      Spouse name: Not on file    Number of children: Not on file    Years of education: Not on file    Highest education level: Not on file   Occupational History    Not on file   Social Needs    Financial resource strain: Not on file    Food insecurity:     Worry: Not on file     Inability: Not on file    Transportation needs:     Medical: Not on file     Non-medical: Not on file   Tobacco Use    Smoking status: Current Every Day Smoker   Substance and Sexual Activity    Alcohol use: Yes    Drug use: Not on file    Sexual activity: Not on file   Lifestyle    Physical activity:     Days per week: Not on file     Minutes per session: Not on file    Stress: Not on file   Relationships    Social connections:     Talks on phone: Not on file     Gets together: Not on file     Attends Christian service: Not on file     Active member of club or organization: Not on file     Attends meetings of clubs or organizations: Not on file     Relationship status: Not on file   Other Topics Concern    Not on file   Social History Narrative    Not on file     Review of Systems   Constitutional: Negative.    HENT: Negative.    Eyes: Negative.    Respiratory: Negative.    Cardiovascular: Negative.    Gastrointestinal: Positive for abdominal distention.   Genitourinary: Negative.    Musculoskeletal: Negative.    Skin: Negative.    Neurological: Negative.    Psychiatric/Behavioral:  Negative.      Vitals:    07/19/19 1512   BP: 133/88   Pulse: 87   Resp: 18   Temp: 98.4 °F (36.9 °C)       Physical Exam   Constitutional: She is oriented to person, place, and time. She appears well-developed and well-nourished.   HENT:   Head: Normocephalic and atraumatic.   Eyes: Pupils are equal, round, and reactive to light. Conjunctivae and EOM are normal. No scleral icterus.   Neck: Normal range of motion. Neck supple. No thyromegaly present.   Cardiovascular: Normal rate, regular rhythm and normal heart sounds.   Pulmonary/Chest: Effort normal and breath sounds normal. She has no rales.   Abdominal: Soft. Bowel sounds are normal. She exhibits distension (+shifting dullness). She exhibits no mass. There is no tenderness.   Musculoskeletal: Normal range of motion. She exhibits no edema.   Neurological: She is alert and oriented to person, place, and time.   Skin: Skin is warm and dry. No rash noted.   Psychiatric: She has a normal mood and affect.   Vitals reviewed.      Lab Results   Component Value Date     (H) 10/11/2018    BUN 6 (L) 10/11/2018    CREATININE 0.7 10/11/2018    CALCIUM 9.1 10/11/2018     10/11/2018    K 3.3 (L) 10/11/2018     10/11/2018    PROT 9.2 (H) 04/12/2018    CO2 24 10/11/2018    ANIONGAP 11 10/11/2018    WBC 5.13 10/11/2018    RBC 4.20 10/11/2018    HGB 13.0 10/11/2018    HCT 38.0 10/11/2018    MCV 91 10/11/2018    MCH 31.0 10/11/2018    MCHC 34.2 10/11/2018     Lab Results   Component Value Date    RDW 14.8 (H) 10/11/2018     (L) 10/11/2018    MPV 11.2 10/11/2018    GRAN 1.9 10/11/2018    GRAN 37.5 (L) 10/11/2018    LYMPH 2.6 10/11/2018    LYMPH 50.3 (H) 10/11/2018    MONO 0.5 10/11/2018    MONO 9.7 10/11/2018    EOSINOPHIL 1.9 10/11/2018    BASOPHIL 0.4 10/11/2018    EOS 0.1 10/11/2018    BASO 0.02 10/11/2018    APTT 37.8 (H) 01/09/2015    ALBUMIN 3.3 (L) 04/12/2018    AST 93 (H) 04/12/2018    ALT 44 04/12/2018    ALKPHOS 129 04/12/2018    MG 2.0  01/09/2015    LABPROT 11.2 01/09/2015    INR 1.1 01/09/2015       Assessment and Plan:    Mar Jin is a 63 y.o. female with with decompensated cirrhosis, a liver mass suspicious for HCC and chronic Hepatitis C  Current recommendations:  1. Liver mass c/w HCC, elevated AFP: recommend TP ct chest/abdo/pelvis; liver tx evaluation; review of films in radiology conference for locoregional therapy  2. Ascites: recommend paracentesis-check fluid for albumin, protein, cell count; after paracentesis start lasix 40 mg daily, aldactone 100 mg and stop lisinopril  3. Leg weakness: not clear this is a simple polyneuropathy-recommend neurology evaluation  4. Portal htn: EGD to screen for varices  5. Colorectal ca screening: colonoscopy  6. Nicotine addiction, continuous: quit smoking  Return 4 weeks       negative...

## 2022-04-19 NOTE — TELEPHONE ENCOUNTER
----- Message from Abril Porter MD sent at 7/22/2019 10:41 AM CDT -----   So, that is my fault. It is hard for me to see if they are cleared for tx or not. They were scheduled under the txp context. I assumed cleared and told Chhaya that the pre coordinator would schedule.  I will have Chhaya schedule ct. I will have Mandi schedule paracentesis  ----- Message -----  From: Jocelyn Broderick  Sent: 7/22/2019  10:21 AM  To: Abril Porter MD, Chhaya Heredia MA    Pt deemed hepatology pt till we get txp Clearance and then assigned a liver coord.  I was out on dima last week so I am guessing no one responded to this as it is not scheduled. Pre txp  Doesn't assume the pt unless seen in txp or  I get clr.after seen in hep.    I requested clr today.   Chhaya, have you started anything with this?   ----- Message -----  From: Abril Porter MD  Sent: 7/19/2019   4:03 PM  To: Txp Liver Referral Pool, #    tx eval for HCC; needs urgent ct next week and paracentesis on same day      
Patient called. Para scheduled for Tues 7/30/19 at 10:30 am [facility].  Patient asked if it can be done tomorrow b/c she has a lab appt at [facility].  [facility] location called. No availabilities for Para on tomorrow 7/23/19.  Pt asked if she wanted to go to the [facility]?  She stated, No. I will make arrangements for Tues 7/30 for the Para.  Pt already scheduled for an appt with Dr Porter on 8/16/19 at 2 pm at the [facility] location.  All appts placed in the mail.  
[FreeTextEntry1] : Physical annual\par see plan \par \par ? Ventral hernia \par General surgery referral \par \par Increased B/P reading \par Low sodium, low cholesterol diet/ increased physical activity \par cut back on caffeine intake\par currently not on medications \par \par Frequent urination/ erectile dysfunction \par Urology referral \par UA and UCX \par \par \par \par Labs ordered pt to follow-up in 1 week for results

## 2023-11-21 NOTE — ED NOTES
Problem: At Risk for Falls  Goal: # Patient does not fall  11/21/2023 0132 by Ghazala Christian RN  Outcome: Outcome Met, Continue evaluating goal progress toward completion  11/21/2023 0131 by Ghazala Christian RN  Outcome: Outcome Met, Continue evaluating goal progress toward completion  Goal: # Takes action to control fall-related risks  11/21/2023 0132 by Ghazala Christian RN  Outcome: Outcome Met, Continue evaluating goal progress toward completion  11/21/2023 0131 by Ghazala Christian RN  Outcome: Outcome Met, Continue evaluating goal progress toward completion  Goal: # Verbalizes understanding of fall risk/precautions  Description: Document education using the patient education activity  11/21/2023 0132 by Ghazala Christian RN  Outcome: Outcome Met, Continue evaluating goal progress toward completion  11/21/2023 0131 by Ghazala Christian RN  Outcome: Outcome Met, Continue evaluating goal progress toward completion     Problem: Fluid Volume Excess  Goal: # Fluid Volume Excess Symptoms Resolved  Description: Treatment often consists of oxygen and respiratory support with diuretic therapy at doses that exceed usual dose (typically doubled).  Monitor patient response to treatment.    Acute dyspnea should resolve quickly if dose is adequate and kidney function is adequate. Dyspnea/SOB should only be observed with Activity after effective treatment. Patient should be able to lie down comfortably, without SOB.  11/21/2023 0132 by Ghazala Christian RN  Outcome: Outcome Met, Continue evaluating goal progress toward completion  11/21/2023 0131 by Ghazala Christian RN  Outcome: Outcome Met, Continue evaluating goal progress toward completion  Goal: # Oxygenation is maintained (SpO2 greater than or equal to 90% or as ordered)  11/21/2023 0132 by Ghazala Christian RN  Outcome: Outcome Met, Continue evaluating goal progress toward completion  11/21/2023 0131 by Ghazala Christian RN  Outcome: Outcome Met, Continue  Patient Identifiers for Mar Jin checked and correct  LOC: The patient is awake, alert and aware of environment with an appropriate affect, the patient is oriented x 3 and speaking appropriate.  APPEARANCE: Patient resting comfortably and in no acute distress, patient is clean and well groomed, patient's clothing is properly fastened.  SKIN: The skin is warm and dry, patient has normal skin turgor and moist mucus membranes,no rashes or lesions.Skin Intact , No Breakdown Noted  Musculoskeletal :  Normal range of motion noted. Moves all extremeties well, No swelling or tenderness noted  RESPIRATORY: Airway is open and patent, respirations are spontaneous, patient has a normal effort and rate.Bilateral Lungs Sounds are clear  CARDIAC: Patient has a normal rate and rhythm, no periphreal edema noted, capillary refill < 3 seconds.   ABDOMEN: Soft and non tender to palpation, no distention noted. Bowels Sounds are +  PULSES: 2+  And symmetrical in all extremeties  NEUROLOGIC: PERRL, left pupil with lateral diplopia, facial expression is symmetrical, patient moving all extremities, normal sensation in all extremities when touched with a finger.The patient is awake, alert and cooperative with a calm affect, patient is aware of environment.    Will continue to monitor     evaluating goal progress toward completion  Goal: # Verbalizes understanding of FVE management plan  Description: Document on Patient Education Activity  11/21/2023 0132 by Ghazala Christian RN  Outcome: Outcome Met, Continue evaluating goal progress toward completion  11/21/2023 0131 by Ghazala Christian RN  Outcome: Outcome Met, Continue evaluating goal progress toward completion     Problem: Fluid Volume Excess, Risk for  Goal: # Absence of Rapid Weight Gain (no more than 2kg in 24 hours)  Description: FVE Risk Patients may gain weight (but not more than 2 kg) but may not require aggressive treatment if in the absence of dyspnea; FVE (actual) patients should be monitored to achieve no weight gain.   11/21/2023 0132 by Ghazala Christian RN  Outcome: Outcome Not Met, Plan Adjusted  11/21/2023 0131 by Ghazala Christian RN  Outcome: Outcome Met, Continue evaluating goal progress toward completion  Goal: # Absence of New Onset Dyspnea  Description: Dyspnea greater than SOB with Activity may be indicator of fluid volume excess  11/21/2023 0132 by Ghazala Christian RN  Outcome: Outcome Met, Continue evaluating goal progress toward completion  11/21/2023 0131 by Ghazala Christian RN  Outcome: Outcome Met, Continue evaluating goal progress toward completion  Goal: # Verbalizes understanding of FVE prevention plan  Description: Document on Patient Education Activity  11/21/2023 0132 by Ghazala Christian RN  Outcome: Outcome Met, Continue evaluating goal progress toward completion  11/21/2023 0131 by Ghazala Christian RN  Outcome: Outcome Met, Continue evaluating goal progress toward completion     Problem: Breathing Pattern Ineffective  Goal: Air exchange is effective, demonstrated by Sp02 sat of greater then or = 92% (or as ordered)  Outcome: Outcome Met, Continue evaluating goal progress toward completion  Goal: Respiratory pattern is quiet and regular without report of SOB  Outcome: Outcome Met, Continue evaluating goal  progress toward completion  Goal: Breathing pattern demonstrates minimal apnea during sleep with appropriate use of airway pressure support devices  Outcome: Outcome Met, Continue evaluating goal progress toward completion  Goal: Verbalizes/demonstrates effective breathing management strategies  Description: Document education using the patient education activity.   Outcome: Outcome Met, Continue evaluating goal progress toward completion  Goal: Minimize respiratory effort related to dyspnea/shortness of breath (Hospice)  Outcome: Outcome Met, Continue evaluating goal progress toward completion